# Patient Record
Sex: MALE | Race: WHITE | Employment: UNEMPLOYED | URBAN - METROPOLITAN AREA
[De-identification: names, ages, dates, MRNs, and addresses within clinical notes are randomized per-mention and may not be internally consistent; named-entity substitution may affect disease eponyms.]

---

## 2024-08-22 ENCOUNTER — HOSPITAL ENCOUNTER (INPATIENT)
Age: 84
LOS: 6 days | Discharge: HOSPICE | DRG: 193 | End: 2024-08-28
Attending: STUDENT IN AN ORGANIZED HEALTH CARE EDUCATION/TRAINING PROGRAM | Admitting: INTERNAL MEDICINE
Payer: MEDICARE

## 2024-08-22 ENCOUNTER — APPOINTMENT (OUTPATIENT)
Dept: GENERAL RADIOLOGY | Age: 84
DRG: 193 | End: 2024-08-22
Payer: MEDICARE

## 2024-08-22 DIAGNOSIS — I48.91 ATRIAL FIBRILLATION, UNSPECIFIED TYPE (HCC): ICD-10-CM

## 2024-08-22 DIAGNOSIS — J18.9 PNEUMONIA OF RIGHT LOWER LOBE DUE TO INFECTIOUS ORGANISM: Primary | ICD-10-CM

## 2024-08-22 DIAGNOSIS — A41.9 SEPTICEMIA (HCC): ICD-10-CM

## 2024-08-22 DIAGNOSIS — R06.02 SHORTNESS OF BREATH: ICD-10-CM

## 2024-08-22 DIAGNOSIS — I48.91 ATRIAL FIBRILLATION WITH RAPID VENTRICULAR RESPONSE (HCC): ICD-10-CM

## 2024-08-22 PROBLEM — I48.0 PAROXYSMAL A-FIB (HCC): Status: ACTIVE | Noted: 2024-08-22

## 2024-08-22 LAB
ALBUMIN SERPL-MCNC: 2.9 G/DL (ref 3.4–5)
ALBUMIN/GLOB SERPL: 0.7 {RATIO} (ref 1.1–2.2)
ALP SERPL-CCNC: 287 U/L (ref 40–129)
ALT SERPL-CCNC: 53 U/L (ref 10–40)
ANION GAP SERPL CALCULATED.3IONS-SCNC: 12 MMOL/L (ref 3–16)
AST SERPL-CCNC: 36 U/L (ref 15–37)
BASE EXCESS BLDV CALC-SCNC: -5.6 MMOL/L (ref -3–3)
BASOPHILS # BLD: 0.1 K/UL (ref 0–0.2)
BASOPHILS NFR BLD: 0.3 %
BILIRUB SERPL-MCNC: 0.6 MG/DL (ref 0–1)
BUN SERPL-MCNC: 54 MG/DL (ref 7–20)
CALCIUM SERPL-MCNC: 8.6 MG/DL (ref 8.3–10.6)
CHLORIDE SERPL-SCNC: 109 MMOL/L (ref 99–110)
CO2 BLDV-SCNC: 20 MMOL/L
CO2 SERPL-SCNC: 19 MMOL/L (ref 21–32)
COHGB MFR BLDV: 1.6 % (ref 0–1.5)
CREAT SERPL-MCNC: 1.4 MG/DL (ref 0.8–1.3)
DEPRECATED RDW RBC AUTO: 15.1 % (ref 12.4–15.4)
EKG DIAGNOSIS: NORMAL
EKG Q-T INTERVAL: 310 MS
EKG QRS DURATION: 90 MS
EKG QTC CALCULATION (BAZETT): 496 MS
EKG R AXIS: 42 DEGREES
EKG T AXIS: -38 DEGREES
EKG VENTRICULAR RATE: 154 BPM
EOSINOPHIL # BLD: 0 K/UL (ref 0–0.6)
EOSINOPHIL NFR BLD: 0.1 %
GFR SERPLBLD CREATININE-BSD FMLA CKD-EPI: 50 ML/MIN/{1.73_M2}
GLUCOSE SERPL-MCNC: 133 MG/DL (ref 70–99)
HCO3 BLDV-SCNC: 18.6 MMOL/L (ref 23–29)
HCT VFR BLD AUTO: 36.6 % (ref 40.5–52.5)
HGB BLD-MCNC: 12 G/DL (ref 13.5–17.5)
INR PPP: 1.27 (ref 0.85–1.15)
LACTATE BLDV-SCNC: 1.7 MMOL/L (ref 0.4–1.9)
LACTATE BLDV-SCNC: 1.9 MMOL/L (ref 0.4–2)
LYMPHOCYTES # BLD: 1.2 K/UL (ref 1–5.1)
LYMPHOCYTES NFR BLD: 6.3 %
MCH RBC QN AUTO: 27.4 PG (ref 26–34)
MCHC RBC AUTO-ENTMCNC: 32.8 G/DL (ref 31–36)
MCV RBC AUTO: 83.5 FL (ref 80–100)
METHGB MFR BLDV: 0.1 %
MONOCYTES # BLD: 1.5 K/UL (ref 0–1.3)
MONOCYTES NFR BLD: 8 %
NEUTROPHILS # BLD: 15.5 K/UL (ref 1.7–7.7)
NEUTROPHILS NFR BLD: 85.3 %
NT-PROBNP SERPL-MCNC: ABNORMAL PG/ML (ref 0–449)
O2 THERAPY: ABNORMAL
PCO2 BLDV: 32.5 MMHG (ref 40–50)
PH BLDV: 7.38 [PH] (ref 7.35–7.45)
PLATELET # BLD AUTO: 572 K/UL (ref 135–450)
PMV BLD AUTO: 7.3 FL (ref 5–10.5)
PO2 BLDV: 44.4 MMHG (ref 25–40)
POTASSIUM SERPL-SCNC: 4.2 MMOL/L (ref 3.5–5.1)
PROCALCITONIN SERPL IA-MCNC: 0.39 NG/ML (ref 0–0.15)
PROT SERPL-MCNC: 7.2 G/DL (ref 6.4–8.2)
PROTHROMBIN TIME: 16.1 SEC (ref 11.9–14.9)
RBC # BLD AUTO: 4.38 M/UL (ref 4.2–5.9)
SAO2 % BLDV: 77 %
SARS-COV-2 RDRP RESP QL NAA+PROBE: NOT DETECTED
SODIUM SERPL-SCNC: 140 MMOL/L (ref 136–145)
TROPONIN, HIGH SENSITIVITY: 43 NG/L (ref 0–22)
TROPONIN, HIGH SENSITIVITY: 44 NG/L (ref 0–22)
WBC # BLD AUTO: 18.2 K/UL (ref 4–11)

## 2024-08-22 PROCEDURE — 2500000003 HC RX 250 WO HCPCS: Performed by: NURSE PRACTITIONER

## 2024-08-22 PROCEDURE — 94640 AIRWAY INHALATION TREATMENT: CPT

## 2024-08-22 PROCEDURE — 83880 ASSAY OF NATRIURETIC PEPTIDE: CPT

## 2024-08-22 PROCEDURE — 85025 COMPLETE CBC W/AUTO DIFF WBC: CPT

## 2024-08-22 PROCEDURE — 6370000000 HC RX 637 (ALT 250 FOR IP): Performed by: INTERNAL MEDICINE

## 2024-08-22 PROCEDURE — 85610 PROTHROMBIN TIME: CPT

## 2024-08-22 PROCEDURE — 2500000003 HC RX 250 WO HCPCS: Performed by: STUDENT IN AN ORGANIZED HEALTH CARE EDUCATION/TRAINING PROGRAM

## 2024-08-22 PROCEDURE — 6360000002 HC RX W HCPCS: Performed by: STUDENT IN AN ORGANIZED HEALTH CARE EDUCATION/TRAINING PROGRAM

## 2024-08-22 PROCEDURE — 5A0955A ASSISTANCE WITH RESPIRATORY VENTILATION, GREATER THAN 96 CONSECUTIVE HOURS, HIGH NASAL FLOW/VELOCITY: ICD-10-PCS | Performed by: INTERNAL MEDICINE

## 2024-08-22 PROCEDURE — 84484 ASSAY OF TROPONIN QUANT: CPT

## 2024-08-22 PROCEDURE — 82803 BLOOD GASES ANY COMBINATION: CPT

## 2024-08-22 PROCEDURE — 2580000003 HC RX 258: Performed by: INTERNAL MEDICINE

## 2024-08-22 PROCEDURE — 2060000000 HC ICU INTERMEDIATE R&B

## 2024-08-22 PROCEDURE — 94761 N-INVAS EAR/PLS OXIMETRY MLT: CPT

## 2024-08-22 PROCEDURE — 87040 BLOOD CULTURE FOR BACTERIA: CPT

## 2024-08-22 PROCEDURE — 96376 TX/PRO/DX INJ SAME DRUG ADON: CPT

## 2024-08-22 PROCEDURE — 2700000000 HC OXYGEN THERAPY PER DAY

## 2024-08-22 PROCEDURE — 80053 COMPREHEN METABOLIC PANEL: CPT

## 2024-08-22 PROCEDURE — 84145 PROCALCITONIN (PCT): CPT

## 2024-08-22 PROCEDURE — 2580000003 HC RX 258: Performed by: STUDENT IN AN ORGANIZED HEALTH CARE EDUCATION/TRAINING PROGRAM

## 2024-08-22 PROCEDURE — 2580000003 HC RX 258: Performed by: NURSE PRACTITIONER

## 2024-08-22 PROCEDURE — 96374 THER/PROPH/DIAG INJ IV PUSH: CPT

## 2024-08-22 PROCEDURE — 87635 SARS-COV-2 COVID-19 AMP PRB: CPT

## 2024-08-22 PROCEDURE — 71045 X-RAY EXAM CHEST 1 VIEW: CPT

## 2024-08-22 PROCEDURE — 6360000002 HC RX W HCPCS: Performed by: NURSE PRACTITIONER

## 2024-08-22 PROCEDURE — 96372 THER/PROPH/DIAG INJ SC/IM: CPT

## 2024-08-22 PROCEDURE — 93010 ELECTROCARDIOGRAM REPORT: CPT | Performed by: INTERNAL MEDICINE

## 2024-08-22 PROCEDURE — 83605 ASSAY OF LACTIC ACID: CPT

## 2024-08-22 PROCEDURE — 99285 EMERGENCY DEPT VISIT HI MDM: CPT

## 2024-08-22 PROCEDURE — 93005 ELECTROCARDIOGRAM TRACING: CPT | Performed by: STUDENT IN AN ORGANIZED HEALTH CARE EDUCATION/TRAINING PROGRAM

## 2024-08-22 RX ORDER — SODIUM CHLORIDE 0.9 % (FLUSH) 0.9 %
5-40 SYRINGE (ML) INJECTION PRN
Status: DISCONTINUED | OUTPATIENT
Start: 2024-08-22 | End: 2024-08-28 | Stop reason: HOSPADM

## 2024-08-22 RX ORDER — DILTIAZEM HYDROCHLORIDE 5 MG/ML
10 INJECTION INTRAVENOUS ONCE
Status: COMPLETED | OUTPATIENT
Start: 2024-08-22 | End: 2024-08-22

## 2024-08-22 RX ORDER — POLYETHYLENE GLYCOL 3350 17 G/17G
17 POWDER, FOR SOLUTION ORAL DAILY PRN
Status: DISCONTINUED | OUTPATIENT
Start: 2024-08-22 | End: 2024-08-28 | Stop reason: HOSPADM

## 2024-08-22 RX ORDER — METOPROLOL TARTRATE 1 MG/ML
2.5 INJECTION, SOLUTION INTRAVENOUS ONCE
Status: COMPLETED | OUTPATIENT
Start: 2024-08-22 | End: 2024-08-22

## 2024-08-22 RX ORDER — ENOXAPARIN SODIUM 150 MG/ML
1 INJECTION SUBCUTANEOUS 2 TIMES DAILY
Status: DISCONTINUED | OUTPATIENT
Start: 2024-08-23 | End: 2024-08-28 | Stop reason: HOSPADM

## 2024-08-22 RX ORDER — ACETAMINOPHEN 650 MG/1
650 SUPPOSITORY RECTAL EVERY 6 HOURS PRN
Status: DISCONTINUED | OUTPATIENT
Start: 2024-08-22 | End: 2024-08-28 | Stop reason: HOSPADM

## 2024-08-22 RX ORDER — DILTIAZEM HYDROCHLORIDE 5 MG/ML
20 INJECTION INTRAVENOUS ONCE
Status: COMPLETED | OUTPATIENT
Start: 2024-08-22 | End: 2024-08-22

## 2024-08-22 RX ORDER — ENOXAPARIN SODIUM 150 MG/ML
1 INJECTION SUBCUTANEOUS ONCE
Status: COMPLETED | OUTPATIENT
Start: 2024-08-22 | End: 2024-08-22

## 2024-08-22 RX ORDER — ONDANSETRON 4 MG/1
4 TABLET, ORALLY DISINTEGRATING ORAL EVERY 8 HOURS PRN
Status: DISCONTINUED | OUTPATIENT
Start: 2024-08-22 | End: 2024-08-28 | Stop reason: HOSPADM

## 2024-08-22 RX ORDER — SODIUM CHLORIDE 0.9 % (FLUSH) 0.9 %
5-40 SYRINGE (ML) INJECTION EVERY 12 HOURS SCHEDULED
Status: DISCONTINUED | OUTPATIENT
Start: 2024-08-22 | End: 2024-08-28 | Stop reason: HOSPADM

## 2024-08-22 RX ORDER — GUAIFENESIN 600 MG/1
600 TABLET, EXTENDED RELEASE ORAL 2 TIMES DAILY
Status: DISCONTINUED | OUTPATIENT
Start: 2024-08-22 | End: 2024-08-28

## 2024-08-22 RX ORDER — IPRATROPIUM BROMIDE AND ALBUTEROL SULFATE 2.5; .5 MG/3ML; MG/3ML
1 SOLUTION RESPIRATORY (INHALATION)
Status: DISCONTINUED | OUTPATIENT
Start: 2024-08-22 | End: 2024-08-24

## 2024-08-22 RX ORDER — ONDANSETRON 2 MG/ML
4 INJECTION INTRAMUSCULAR; INTRAVENOUS EVERY 6 HOURS PRN
Status: DISCONTINUED | OUTPATIENT
Start: 2024-08-22 | End: 2024-08-28 | Stop reason: HOSPADM

## 2024-08-22 RX ORDER — DOXYCYCLINE HYCLATE 100 MG
100 TABLET ORAL EVERY 12 HOURS SCHEDULED
Status: COMPLETED | OUTPATIENT
Start: 2024-08-22 | End: 2024-08-27

## 2024-08-22 RX ORDER — SODIUM CHLORIDE 9 MG/ML
INJECTION, SOLUTION INTRAVENOUS PRN
Status: DISCONTINUED | OUTPATIENT
Start: 2024-08-22 | End: 2024-08-28 | Stop reason: HOSPADM

## 2024-08-22 RX ORDER — ACETAMINOPHEN 325 MG/1
650 TABLET ORAL EVERY 6 HOURS PRN
Status: DISCONTINUED | OUTPATIENT
Start: 2024-08-22 | End: 2024-08-28 | Stop reason: HOSPADM

## 2024-08-22 RX ADMIN — METOPROLOL TARTRATE 2.5 MG: 5 INJECTION INTRAVENOUS at 20:22

## 2024-08-22 RX ADMIN — SODIUM CHLORIDE, PRESERVATIVE FREE 10 ML: 5 INJECTION INTRAVENOUS at 20:22

## 2024-08-22 RX ADMIN — AMIODARONE HYDROCHLORIDE 1 MG/MIN: 50 INJECTION, SOLUTION INTRAVENOUS at 22:45

## 2024-08-22 RX ADMIN — GUAIFENESIN 600 MG: 600 TABLET ORAL at 20:22

## 2024-08-22 RX ADMIN — SODIUM CHLORIDE 5 MG/HR: 900 INJECTION, SOLUTION INTRAVENOUS at 16:03

## 2024-08-22 RX ADMIN — DOXYCYCLINE HYCLATE 100 MG: 100 TABLET, COATED ORAL at 20:22

## 2024-08-22 RX ADMIN — DILTIAZEM HYDROCHLORIDE 10 MG: 5 INJECTION, SOLUTION INTRAVENOUS at 15:26

## 2024-08-22 RX ADMIN — CEFTRIAXONE SODIUM 1000 MG: 1 INJECTION, POWDER, FOR SOLUTION INTRAMUSCULAR; INTRAVENOUS at 16:46

## 2024-08-22 RX ADMIN — DILTIAZEM HYDROCHLORIDE 20 MG: 5 INJECTION, SOLUTION INTRAVENOUS at 15:57

## 2024-08-22 RX ADMIN — ENOXAPARIN SODIUM 120 MG: 150 INJECTION SUBCUTANEOUS at 16:47

## 2024-08-22 RX ADMIN — IPRATROPIUM BROMIDE AND ALBUTEROL SULFATE 1 DOSE: 2.5; .5 SOLUTION RESPIRATORY (INHALATION) at 20:02

## 2024-08-22 ASSESSMENT — PAIN SCALES - GENERAL
PAINLEVEL_OUTOF10: 0

## 2024-08-22 ASSESSMENT — PAIN - FUNCTIONAL ASSESSMENT: PAIN_FUNCTIONAL_ASSESSMENT: 0-10

## 2024-08-22 NOTE — ED NOTES
Low Brooks is a 83 y.o. male admitted for  Principal Problem:    Paroxysmal A-fib (HCC)  Resolved Problems:    * No resolved hospital problems. *  .   Patient Home via self with   Chief Complaint   Patient presents with    Fatigue    Shortness of Breath     Patient arrives from home C/O generalized weakness and SOB. Patient reports signs and symptoms have been ongoing for \"months.\" Patient denies chest pain, denies N/V. EMS reports patient does not wear oxygen at baseline, 86% RA, placed on 2L NC initially but titrated to 4L NC. Patient poor historian, unknown medical and surgical history.    .  Patient is alert and Person, Place, Time, and Situation  Patient's baseline mobility: Baseline Mobility: Walker  Code Status: Full Code   Cardiac Rhythm:    O2 Flow Rate (L/min): 8 L/min  Is patient on baseline Oxygen: no how many Liters8:   Abnormal Assessment Findings:      Isolation: None      NIH Score:    C-SSRS: Risk of Suicide: No Risk  Bedside swallow:        Active LDA's:   Peripheral IV 08/22/24 Right Forearm (Active)       Peripheral IV 08/22/24 Right Hand (Active)     Patient admitted with a crow: no If the crow is chronic was it exchanged:  Reason for crow:   Patient admitted with Central Line:  . PICC line placement confirmed: YES OR NO:[id number]}   Reason for Central line:   Was central line Inserted from an outside facility:        Family/Caregiver Present yes Any Concerns: no   Restraints no  Sitter no         Vitals: MEWS Score: 6    Vitals:    08/22/24 1635 08/22/24 1636 08/22/24 1705 08/22/24 1728   BP:  129/85 (!) 130/92 132/79   Pulse:  (!) 140 (!) 144 (!) 137   Resp:  (!) 47 (!) 46 (!) 46   Temp:       TempSrc:       SpO2:  (!) 88% 92% 92%   Weight: 122.5 kg (270 lb)      Height: 1.93 m (6' 4\")          Last documented pain score (0-10 scale) Pain Level: 0 (Patient denies pain)  Pain medication administered No.    Pertinent or High Risk Medications/Drips: Yes- see MAR.    Pending Blood Product  pneumonia. Patient on high flow nasal cannula at 8L, currently on diltiazem drip.   If any further questions, please call Sending RN at [phone]

## 2024-08-22 NOTE — ED PROVIDER NOTES
[facility]  ED      CHIEF COMPLAINT  Fatigue and Shortness of Breath (Patient arrives from home C/O generalized weakness and SOB. Patient reports signs and symptoms have been ongoing for \"months.\" Patient denies chest pain, denies N/V. EMS reports patient does not wear oxygen at baseline, 86% RA, placed on 2L NC initially but titrated to 4L NC. Patient poor historian, unknown medical and surgical history. )       HISTORY OF PRESENT ILLNESS  Low Brooks is a 83 y.o. male  who presents to the ED complaining of generalized weakness and shortness of breath.  Patient states symptoms have been ongoing for months and states that the shortness of breath comes and goes.  His wife at the bedside assist with the history and reports that over the last 2 to 3 days he has been significantly more short of breath, particularly not being able to walk around the home without severe dyspnea, which is unusual for him.  He denies any chest pain, nausea, vomiting, diarrhea.  They have noticed some leg swelling recently.  On arrival he was 86% on room air, improved on 5 L nasal cannula.    No other complaints, modifying factors or associated symptoms.     I have reviewed the following from the nursing documentation.    Past Medical History:   Diagnosis Date    Hyperlipidemia     Hypertension      No past surgical history on file.  No family history on file.  Social History     Socioeconomic History    Marital status:      Spouse name: Not on file    Number of children: Not on file    Years of education: Not on file    Highest education level: Not on file   Occupational History    Not on file   Tobacco Use    Smoking status: Not on file    Smokeless tobacco: Not on file   Substance and Sexual Activity    Alcohol use: Not on file    Drug use: Not on file    Sexual activity: Not on file   Other Topics Concern    Not on file   Social History Narrative    Not on file     Social Determinants of Health     Financial

## 2024-08-22 NOTE — PROGRESS NOTES
4 Eyes Skin Assessment     NAME:  Low Brooks  YOB: 1940  MEDICAL RECORD NUMBER:  [id number]    The patient is being assessed for  Admission    I agree that at least one RN has performed a thorough Head to Toe Skin Assessment on the patient. ALL assessment sites listed below have been assessed.      Areas assessed by both nurses:    Head, Face, Ears, Shoulders, Back, Chest, Arms, Elbows, Hands, Sacrum. Buttock, Coccyx, Ischium, Legs. Feet and Heels, and Under Medical Devices         Does the Patient have a Wound? No noted wound(s)       Waldemar Prevention initiated by RN: Yes  Wound Care Orders initiated by RN: No    Pressure Injury (Stage 3,4, Unstageable, DTI, NWPT, and Complex wounds) if present, place Wound referral order by RN under : No    New Ostomies, if present place, Ostomy referral order under : No     Nurse 1 eSignature: Electronically signed by Giovanni Fang RN on 8/22/24 at 7:32 PM EDT    **SHARE this note so that the co-signing nurse can place an eSignature**    Nurse 2 eSignature: Electronically signed by Samreen Perera RN on 8/22/24 at 11:19 PM EDT

## 2024-08-22 NOTE — ED NOTES
Blood cultures obtained prior to antibiotic administration. Cultures scanned in soft lab but not clicked off in Epic.

## 2024-08-23 ENCOUNTER — APPOINTMENT (OUTPATIENT)
Age: 84
DRG: 193 | End: 2024-08-23
Attending: INTERNAL MEDICINE
Payer: MEDICARE

## 2024-08-23 ENCOUNTER — TELEPHONE (OUTPATIENT)
Dept: CARDIOLOGY CLINIC | Age: 84
End: 2024-08-23

## 2024-08-23 LAB
ALBUMIN SERPL-MCNC: 2.9 G/DL (ref 3.4–5)
ALBUMIN/GLOB SERPL: 0.7 {RATIO} (ref 1.1–2.2)
ALP SERPL-CCNC: 269 U/L (ref 40–129)
ALT SERPL-CCNC: 52 U/L (ref 10–40)
ANION GAP SERPL CALCULATED.3IONS-SCNC: 13 MMOL/L (ref 3–16)
AST SERPL-CCNC: 40 U/L (ref 15–37)
BASOPHILS # BLD: 0 K/UL (ref 0–0.2)
BASOPHILS NFR BLD: 0.2 %
BILIRUB SERPL-MCNC: 0.4 MG/DL (ref 0–1)
BUN SERPL-MCNC: 69 MG/DL (ref 7–20)
CALCIUM SERPL-MCNC: 8.4 MG/DL (ref 8.3–10.6)
CHLORIDE SERPL-SCNC: 108 MMOL/L (ref 99–110)
CO2 SERPL-SCNC: 17 MMOL/L (ref 21–32)
CREAT SERPL-MCNC: 1.5 MG/DL (ref 0.8–1.3)
DEPRECATED RDW RBC AUTO: 15 % (ref 12.4–15.4)
ECHO AO ROOT DIAM: 3.9 CM
ECHO AO ROOT INDEX: 1.55 CM/M2
ECHO AV AREA PEAK VELOCITY: 3.8 CM2
ECHO AV AREA/BSA PEAK VELOCITY: 1.5 CM2/M2
ECHO AV CUSP MM: 2.3 CM
ECHO AV PEAK GRADIENT: 6 MMHG
ECHO AV PEAK VELOCITY: 1.3 M/S
ECHO AV VELOCITY RATIO: 0.77
ECHO BSA: 2.56 M2
ECHO EST RA PRESSURE: 8 MMHG
ECHO IVC EXP: 1.9 CM
ECHO IVC INSP: 1.1 CM
ECHO LA AREA 2C: 31.9 CM2
ECHO LA AREA 4C: 39.6 CM2
ECHO LA DIAMETER INDEX: 2.27 CM/M2
ECHO LA DIAMETER: 5.7 CM
ECHO LA MAJOR AXIS: 8.7 CM
ECHO LA MINOR AXIS: 8.2 CM
ECHO LA TO AORTIC ROOT RATIO: 1.46
ECHO LA VOL BP: 124 ML (ref 18–58)
ECHO LA VOL MOD A2C: 101 ML (ref 18–58)
ECHO LA VOL MOD A4C: 146 ML (ref 18–58)
ECHO LA VOL/BSA BIPLANE: 49 ML/M2 (ref 16–34)
ECHO LA VOLUME INDEX MOD A2C: 40 ML/M2 (ref 16–34)
ECHO LA VOLUME INDEX MOD A4C: 58 ML/M2 (ref 16–34)
ECHO LV E' SEPTAL VELOCITY: 7 CM/S
ECHO LV EDV A2C: 188 ML
ECHO LV EDV A4C: 185 ML
ECHO LV EDV BP: 189 ML (ref 67–155)
ECHO LV EDV INDEX A4C: 74 ML/M2
ECHO LV EDV INDEX BP: 75 ML/M2
ECHO LV EDV NDEX A2C: 75 ML/M2
ECHO LV EF PHYSICIAN: 28 %
ECHO LV EJECTION FRACTION A2C: 18 %
ECHO LV EJECTION FRACTION A4C: 25 %
ECHO LV ESV A2C: 154 ML
ECHO LV ESV A4C: 138 ML
ECHO LV ESV BP: 147 ML (ref 22–58)
ECHO LV ESV INDEX A2C: 61 ML/M2
ECHO LV ESV INDEX A4C: 55 ML/M2
ECHO LV ESV INDEX BP: 59 ML/M2
ECHO LV FRACTIONAL SHORTENING: 18 % (ref 28–44)
ECHO LV INTERNAL DIMENSION DIASTOLE INDEX: 2.47 CM/M2
ECHO LV INTERNAL DIMENSION DIASTOLIC: 6.2 CM (ref 4.2–5.9)
ECHO LV INTERNAL DIMENSION SYSTOLIC INDEX: 2.03 CM/M2
ECHO LV INTERNAL DIMENSION SYSTOLIC: 5.1 CM
ECHO LV ISOVOLUMETRIC RELAXATION TIME (IVRT): 74 MS
ECHO LV IVSD: 1.3 CM (ref 0.6–1)
ECHO LV MASS 2D: 313.3 G (ref 88–224)
ECHO LV MASS INDEX 2D: 124.8 G/M2 (ref 49–115)
ECHO LV POSTERIOR WALL DIASTOLIC: 1 CM (ref 0.6–1)
ECHO LV RELATIVE WALL THICKNESS RATIO: 0.32
ECHO LVOT AREA: 4.9 CM2
ECHO LVOT DIAM: 2.5 CM
ECHO LVOT MEAN GRADIENT: 2 MMHG
ECHO LVOT PEAK GRADIENT: 4 MMHG
ECHO LVOT PEAK VELOCITY: 1 M/S
ECHO LVOT STROKE VOLUME INDEX: 28 ML/M2
ECHO LVOT SV: 70.2 ML
ECHO LVOT VTI: 14.3 CM
ECHO MV E VELOCITY: 0.8 M/S
ECHO MV E/E' SEPTAL: 11.43
ECHO MV REGURGITANT ALIASING (NYQUIST) VELOCITY: 39 CM/S
ECHO MV REGURGITANT RADIUS PISA: 0.5 CM
ECHO MV REGURGITANT VTIA: 124 CM
ECHO RA AREA 4C: 41.7 CM2
ECHO RA END SYSTOLIC VOLUME APICAL 4 CHAMBER INDEX BSA: 64 ML/M2
ECHO RA VOLUME: 160 ML
ECHO RIGHT VENTRICULAR SYSTOLIC PRESSURE (RVSP): 23 MMHG
ECHO RV BASAL DIMENSION: 4.8 CM
ECHO RV EJECTION FRACTION 3D: 36 %
ECHO RV FRACTIONAL AREA CHANGE: 31 %
ECHO RV FREE WALL PEAK S': 14 CM/S
ECHO RV INTERNAL DIMENSION: 3.9 CM
ECHO RV LONGITUDINAL DIMENSION: 8.9 CM
ECHO RV MID DIMENSION: 3.4 CM
ECHO RV TAPSE: 1.3 CM (ref 1.7–?)
ECHO TV REGURGITANT MAX VELOCITY: 1.95 M/S
ECHO TV REGURGITANT PEAK GRADIENT: 15 MMHG
EOSINOPHIL # BLD: 0 K/UL (ref 0–0.6)
EOSINOPHIL NFR BLD: 0 %
GFR SERPLBLD CREATININE-BSD FMLA CKD-EPI: 46 ML/MIN/{1.73_M2}
GLUCOSE SERPL-MCNC: 170 MG/DL (ref 70–99)
HCT VFR BLD AUTO: 36.5 % (ref 40.5–52.5)
HGB BLD-MCNC: 11.7 G/DL (ref 13.5–17.5)
LACTATE BLDV-SCNC: 1.7 MMOL/L (ref 0.4–2)
LACTATE BLDV-SCNC: 1.9 MMOL/L (ref 0.4–2)
LACTATE BLDV-SCNC: 2.1 MMOL/L (ref 0.4–2)
LACTATE BLDV-SCNC: 2.8 MMOL/L (ref 0.4–2)
LYMPHOCYTES # BLD: 0.7 K/UL (ref 1–5.1)
LYMPHOCYTES NFR BLD: 6 %
Lab: -13 %
MCH RBC QN AUTO: 26.9 PG (ref 26–34)
MCHC RBC AUTO-ENTMCNC: 32.2 G/DL (ref 31–36)
MCV RBC AUTO: 83.5 FL (ref 80–100)
MONOCYTES # BLD: 0.4 K/UL (ref 0–1.3)
MONOCYTES NFR BLD: 3.5 %
NEUTROPHILS # BLD: 11 K/UL (ref 1.7–7.7)
NEUTROPHILS NFR BLD: 90.3 %
PLATELET # BLD AUTO: 553 K/UL (ref 135–450)
PMV BLD AUTO: 7.2 FL (ref 5–10.5)
POTASSIUM SERPL-SCNC: 4.1 MMOL/L (ref 3.5–5.1)
PROT SERPL-MCNC: 7.1 G/DL (ref 6.4–8.2)
RBC # BLD AUTO: 4.37 M/UL (ref 4.2–5.9)
SODIUM SERPL-SCNC: 138 MMOL/L (ref 136–145)
WBC # BLD AUTO: 12.2 K/UL (ref 4–11)

## 2024-08-23 PROCEDURE — 93306 TTE W/DOPPLER COMPLETE: CPT | Performed by: INTERNAL MEDICINE

## 2024-08-23 PROCEDURE — 2500000003 HC RX 250 WO HCPCS: Performed by: NURSE PRACTITIONER

## 2024-08-23 PROCEDURE — 92610 EVALUATE SWALLOWING FUNCTION: CPT

## 2024-08-23 PROCEDURE — 51702 INSERT TEMP BLADDER CATH: CPT

## 2024-08-23 PROCEDURE — 83605 ASSAY OF LACTIC ACID: CPT

## 2024-08-23 PROCEDURE — 2580000003 HC RX 258: Performed by: INTERNAL MEDICINE

## 2024-08-23 PROCEDURE — 93306 TTE W/DOPPLER COMPLETE: CPT

## 2024-08-23 PROCEDURE — 94640 AIRWAY INHALATION TREATMENT: CPT

## 2024-08-23 PROCEDURE — 6370000000 HC RX 637 (ALT 250 FOR IP): Performed by: INTERNAL MEDICINE

## 2024-08-23 PROCEDURE — 99291 CRITICAL CARE FIRST HOUR: CPT | Performed by: INTERNAL MEDICINE

## 2024-08-23 PROCEDURE — 51701 INSERT BLADDER CATHETER: CPT

## 2024-08-23 PROCEDURE — 85025 COMPLETE CBC W/AUTO DIFF WBC: CPT

## 2024-08-23 PROCEDURE — 97166 OT EVAL MOD COMPLEX 45 MIN: CPT

## 2024-08-23 PROCEDURE — 97162 PT EVAL MOD COMPLEX 30 MIN: CPT

## 2024-08-23 PROCEDURE — 76376 3D RENDER W/INTRP POSTPROCES: CPT | Performed by: INTERNAL MEDICINE

## 2024-08-23 PROCEDURE — 6360000002 HC RX W HCPCS: Performed by: NURSE PRACTITIONER

## 2024-08-23 PROCEDURE — 6360000002 HC RX W HCPCS

## 2024-08-23 PROCEDURE — 80053 COMPREHEN METABOLIC PANEL: CPT

## 2024-08-23 PROCEDURE — 2500000003 HC RX 250 WO HCPCS

## 2024-08-23 PROCEDURE — 6360000002 HC RX W HCPCS: Performed by: INTERNAL MEDICINE

## 2024-08-23 PROCEDURE — 97530 THERAPEUTIC ACTIVITIES: CPT

## 2024-08-23 PROCEDURE — 2700000000 HC OXYGEN THERAPY PER DAY

## 2024-08-23 PROCEDURE — 36415 COLL VENOUS BLD VENIPUNCTURE: CPT

## 2024-08-23 PROCEDURE — 2580000003 HC RX 258: Performed by: NURSE PRACTITIONER

## 2024-08-23 PROCEDURE — 51798 US URINE CAPACITY MEASURE: CPT

## 2024-08-23 PROCEDURE — 94761 N-INVAS EAR/PLS OXIMETRY MLT: CPT

## 2024-08-23 PROCEDURE — 2060000000 HC ICU INTERMEDIATE R&B

## 2024-08-23 RX ORDER — FUROSEMIDE 10 MG/ML
20 INJECTION INTRAMUSCULAR; INTRAVENOUS ONCE
Status: COMPLETED | OUTPATIENT
Start: 2024-08-23 | End: 2024-08-23

## 2024-08-23 RX ORDER — AMLODIPINE BESYLATE 10 MG/1
10 TABLET ORAL DAILY
Status: ON HOLD | COMMUNITY
End: 2024-08-28 | Stop reason: HOSPADM

## 2024-08-23 RX ORDER — ATORVASTATIN CALCIUM 20 MG/1
20 TABLET, FILM COATED ORAL DAILY
Status: ON HOLD | COMMUNITY
End: 2024-08-28 | Stop reason: HOSPADM

## 2024-08-23 RX ORDER — AMIODARONE HYDROCHLORIDE 200 MG/1
200 TABLET ORAL 2 TIMES DAILY
Status: DISCONTINUED | OUTPATIENT
Start: 2024-08-23 | End: 2024-08-24

## 2024-08-23 RX ORDER — METOPROLOL TARTRATE 1 MG/ML
5 INJECTION, SOLUTION INTRAVENOUS ONCE
Status: COMPLETED | OUTPATIENT
Start: 2024-08-23 | End: 2024-08-23

## 2024-08-23 RX ADMIN — AMIODARONE HYDROCHLORIDE 0.5 MG/MIN: 50 INJECTION, SOLUTION INTRAVENOUS at 20:48

## 2024-08-23 RX ADMIN — FUROSEMIDE 20 MG: 10 INJECTION, SOLUTION INTRAMUSCULAR; INTRAVENOUS at 09:43

## 2024-08-23 RX ADMIN — IPRATROPIUM BROMIDE AND ALBUTEROL SULFATE 1 DOSE: 2.5; .5 SOLUTION RESPIRATORY (INHALATION) at 07:43

## 2024-08-23 RX ADMIN — DOXYCYCLINE HYCLATE 100 MG: 100 TABLET, COATED ORAL at 08:29

## 2024-08-23 RX ADMIN — CEFTRIAXONE SODIUM 1000 MG: 1 INJECTION, POWDER, FOR SOLUTION INTRAMUSCULAR; INTRAVENOUS at 09:46

## 2024-08-23 RX ADMIN — METOPROLOL TARTRATE 5 MG: 5 INJECTION INTRAVENOUS at 14:12

## 2024-08-23 RX ADMIN — ENOXAPARIN SODIUM 120 MG: 150 INJECTION SUBCUTANEOUS at 08:29

## 2024-08-23 RX ADMIN — GUAIFENESIN 600 MG: 600 TABLET ORAL at 20:42

## 2024-08-23 RX ADMIN — DOXYCYCLINE HYCLATE 100 MG: 100 TABLET, COATED ORAL at 20:42

## 2024-08-23 RX ADMIN — METOPROLOL TARTRATE 5 MG: 1 INJECTION, SOLUTION INTRAVENOUS at 01:18

## 2024-08-23 RX ADMIN — GUAIFENESIN 600 MG: 600 TABLET ORAL at 08:29

## 2024-08-23 RX ADMIN — METOPROLOL TARTRATE 5 MG: 5 INJECTION INTRAVENOUS at 09:22

## 2024-08-23 RX ADMIN — IPRATROPIUM BROMIDE AND ALBUTEROL SULFATE 1 DOSE: 2.5; .5 SOLUTION RESPIRATORY (INHALATION) at 11:51

## 2024-08-23 RX ADMIN — ENOXAPARIN SODIUM 120 MG: 150 INJECTION SUBCUTANEOUS at 20:43

## 2024-08-23 RX ADMIN — AMIODARONE HYDROCHLORIDE 0.5 MG/MIN: 50 INJECTION, SOLUTION INTRAVENOUS at 04:46

## 2024-08-23 RX ADMIN — IPRATROPIUM BROMIDE AND ALBUTEROL SULFATE 1 DOSE: 2.5; .5 SOLUTION RESPIRATORY (INHALATION) at 19:24

## 2024-08-23 RX ADMIN — METOPROLOL TARTRATE 5 MG: 5 INJECTION INTRAVENOUS at 00:24

## 2024-08-23 RX ADMIN — AMIODARONE HYDROCHLORIDE 200 MG: 200 TABLET ORAL at 17:04

## 2024-08-23 ASSESSMENT — PAIN SCALES - GENERAL
PAINLEVEL_OUTOF10: 0

## 2024-08-23 NOTE — PROGRESS NOTES
Occupational Therapy  Facility/Department: [facility]  Occupational Therapy Initial Assessment and Treatment Note     Name: Low Brooks  : 1940  MRN: [id number]  Date of Service: 2024    Discharge Recommendations:  Subacute/Skilled Nursing Facility     Therapy discharge recommendations are subject to collaboration from the patient’s interdisciplinary healthcare team, including MD and case management recommendations.    Barriers to Home Discharge:   [] Steps to access home entry or bed/bath:   [x] Unable to transfer, ambulate, or propel wheelchair household distances without assist   [x] Limited available assist at home upon discharge    [] Patient or family requests d/c to post-acute facility    [x] Poor cognition/safety awareness for d/c to home alone    [] Unable to maintain ordered weight bearing status    [] Patient with salient signs of long-standing immobility   [x] Decreased independence with ADLs   [x] Increased risk for falls   [] Other:    If pt is unable to be seen after this session, please let this note serve as discharge summary.  Please see case management note for discharge disposition.  Thank you.       Patient Diagnosis(es): The primary encounter diagnosis was Pneumonia of right lower lobe due to infectious organism. Diagnoses of Septicemia (HCC), Atrial fibrillation with rapid ventricular response (HCC), Atrial fibrillation, unspecified type (HCC), and Shortness of breath were also pertinent to this visit.  Past Medical History:  has a past medical history of Hyperlipidemia and Hypertension.  Past Surgical History:  has no past surgical history on file.    Treatment Diagnosis: Paroxysmal A-fib (HCC)      Assessment  Performance deficits / Impairments: Decreased functional mobility ;Decreased balance;Decreased safe awareness;Decreased ADL status;Decreased endurance;Decreased strength  Assessment: Pt admitted to [facility] for SOB and dyspnea. Pt from home with spouse, reports IND in

## 2024-08-23 NOTE — PROGRESS NOTES
0112: Patient attempting to urinate 2 times in last 45 minutes with no success. States he feels the urge to urinate but cannot. Bladder scan showed 460mL. Jhony Connors NP notified via perfect serve. One time Straight Catheterization ordered.     0136: 440mL out with straight cath. Post cath residual Bladder scan showed 50mL.

## 2024-08-23 NOTE — RT PROTOCOL NOTE
RT Inhaler-Nebulizer Bronchodilator Protocol Note    There is a bronchodilator order in the chart from a provider indicating to follow the RT Bronchodilator Protocol and there is an “Initiate RT Inhaler-Nebulizer Bronchodilator Protocol” order as well (see protocol at bottom of note).    CXR Findings:  XR CHEST PORTABLE    Result Date: 8/22/2024  1. Patchy consolidation of the right lung base concerning for pneumonia. Follow-up to resolution is recommended. 2. Cardiomegaly.       The findings from the last RT Protocol Assessment were as follows:   History Pulmonary Disease: None or smoker <15 pack years  Respiratory Pattern: Regular pattern and RR 12-20 bpm  Breath Sounds: Slightly diminished and/or crackles  Cough: Strong, spontaneous, non-productive  Indication for Bronchodilator Therapy: None  Bronchodilator Assessment Score: 2    Aerosolized bronchodilator medication orders have been revised according to the RT Inhaler-Nebulizer Bronchodilator Protocol below.    Respiratory Therapist to perform RT Therapy Protocol Assessment initially then follow the protocol.  Repeat RT Therapy Protocol Assessment PRN for score 0-3 or on second treatment, BID, and PRN for scores above 3.    No Indications - adjust the frequency to every 6 hours PRN wheezing or bronchospasm, if no treatments needed after 48 hours then discontinue using Per Protocol order mode.     If indication present, adjust the RT bronchodilator orders based on the Bronchodilator Assessment Score as indicated below.  Use Inhaler orders unless patient has one or more of the following: on home nebulizer, not able to hold breath for 10 seconds, is not alert and oriented, cannot activate and use MDI correctly, or respiratory rate 25 breaths per minute or more, then use the equivalent nebulizer order(s) with same Frequency and PRN reasons based on the score.  If a patient is on this medication at home then do not decrease Frequency below that used at home.    0-3

## 2024-08-23 NOTE — PLAN OF CARE
SLP completed evaluation. Please refer to notes in EMR.   Laura Salazar M.S. [facility]-SLP #[id number]  Speech Language Pathologist

## 2024-08-23 NOTE — PLAN OF CARE
Problem: Discharge Planning  Goal: Discharge to home or other facility with appropriate resources  8/23/2024 1430 by Madisyn Boucher RN  Outcome: Progressing  8/23/2024 0337 by Samreen Perera RN  Outcome: Progressing  Flowsheets (Taken 8/22/2024 1825 by Giovanni Fang, RN)  Discharge to home or other facility with appropriate resources:   Identify barriers to discharge with patient and caregiver   Arrange for needed discharge resources and transportation as appropriate     Problem: Pain  Goal: Verbalizes/displays adequate comfort level or baseline comfort level  8/23/2024 1430 by Madisyn Boucher RN  Outcome: Progressing  8/23/2024 0337 by Samreen Perera RN  Outcome: Progressing     Problem: Skin/Tissue Integrity  Goal: Absence of new skin breakdown  Description: 1.  Monitor for areas of redness and/or skin breakdown  2.  Assess vascular access sites hourly  3.  Every 4-6 hours minimum:  Change oxygen saturation probe site  4.  Every 4-6 hours:  If on nasal continuous positive airway pressure, respiratory therapy assess nares and determine need for appliance change or resting period.  8/23/2024 1430 by Madisyn Boucher RN  Outcome: Progressing  8/23/2024 0337 by Samreen Perera RN  Outcome: Progressing     Problem: Safety - Adult  Goal: Free from fall injury  8/23/2024 1430 by Madisyn Boucher RN  Outcome: Progressing  8/23/2024 0337 by Samreen Perera RN  Outcome: Progressing

## 2024-08-23 NOTE — CONSULTS
Consult Call Back    Who: [facility]   Date:8/22/2024,  Time:10:55 PM    Voicemail left  Electronically signed by Brittnee Ramirez on 8/22/24 at 10:55 PM EDT

## 2024-08-23 NOTE — PROGRESS NOTES
Hospital Medicine Progress Note      Date of Admission: 8/22/2024  Hospital Day: 2    Chief Admission Complaint:  Shortness of breath, Atrial fibrillation     Subjective:  Patient seen and examined this morning. He states that he is mildly short of breath. He states that he has never been diagnosed with heart failure or atrial fibrillation. He denies any chest pain.     Presenting Admission History:       83 y.o. male who presented to the emergency room with a chief complaint of shortness of breath and feeling generalized fatigue and weakness.       Upon arrival to the emergency room patient was noted to have elevated heart rate ranging from 130s to 170s  Twelve-lead EKG reveals A-fib with RVR patient workup was started with IV diltiazem bolus of 10 mg with little effect on heart rate patient received another 20 mg followed by diltiazem drip  Patient's heart rate gradually improved in the range of 130s     Currently on no home medications available.  The workup in the emergency room notable for:     Upon arrival to the emergency room patient's saturation was 86% on room air.  Patient placed on high flow and saturation increased to 93% at 6 L     Laboratory workup shows elevated troponin and proBNP  Chest x-ray concerning for pneumonia  Patient started on ceftriaxone azithromycin  Patient received IV fluid hydration and 1 L IV fluid bolus  Lactic acid cultures obtained  Due to child's to VASc and new A-fib given dose of Lovenox patient saturation around 90s started on 4 L nasal cannula     Patient denies any nausea vomiting     No diarrhea no constipation no hematemesis no rectal bleeding no burning sensation by urination no UTI symptoms no flank pain  Patient denies any muscle weakness paralysis or loss of sensation no blurry vision no double vision no neck stiffness.  No recent traveling no history of exposure to sick contacts    Assessment/Plan:      Current Principal Problem:  Paroxysmal A-fib (HCC)    Atrial  Fibrillation with RVR   Elevated BNP   Elevated Troponin   - Elevated BNP and trop likely secondary to a. Fib   - Echo ordered   - Full dose Lovenox   - Amiodarone gtt   - Several IV pushes of metoprolol   - IV lasix 20 mg x1, will likely need additional doses tomorrow   - Cardiology consulted     Acute Hypoxic Respiratory Failure   - Secondary to PNA and volume overload   - IV lasix x1   - Continue Abx   - Wean O2 as able     Community Acquired Pneumonia   - CXR shows potential PNA   - Elevated WBC   - Continue ceftriaxone and doxycycline     Acute Kidney Injury   - Cr elevated at 1.5  - Secondary to atrial fibrillation vs volume overload  - Gustafson inserted for retention and accurate I&Os   - Avoid nephrotoxic medications  - Continue to monitor     Physical Exam Performed:      General appearance:  No acute distress, appears stated age and cooperative.  HEENT:  Normal cephalic, atraumatic without obvious deformity. Conjunctivae/corneas clear.  Neck: No jugular venous distention.    Respiratory:  Normal respiratory effort. Clear to auscultation, bilaterally without Rales/Wheezes/Rhonchi.  Cardiovascular:  Regular rate and rhythm without murmurs, rubs or gallops.  Abdomen: Soft, non-tender, non-distended with normal bowel sounds.  Musculoskeletal:  No clubbing, cyanosis. 2+ pitting edema bilaterally   Skin: Skin color, texture, turgor normal.  No rashes or lesions.  Neurologic:  Neurovascularly intact without any focal sensory/motor deficits.   Psychiatric:  Alert and oriented, thought content appropriate, normal insight      /78   Pulse (!) 115   Temp 97.7 °F (36.5 °C) (Oral)   Resp 20   Ht 1.93 m (6' 4\")   Wt 121.4 kg (267 lb 9.6 oz)   SpO2 94%   BMI 32.57 kg/m²     Diet: ADULT DIET; Regular    DVT Prophylaxis: []PPx LMWH  []SQ Heparin  []IPC/SCDs  []Eliquis  []Xarelto  []Coumadin  [x]Other -  full dose lovenox     Code status: Full Code    PT/OT Eval Status:   [x]NOT yet ordered  []Ordered and Pending

## 2024-08-23 NOTE — PLAN OF CARE
Problem: Discharge Planning  Goal: Discharge to home or other facility with appropriate resources  Outcome: Progressing  Flowsheets (Taken 8/22/2024 1825 by Giovanni Fang RN)  Discharge to home or other facility with appropriate resources:   Identify barriers to discharge with patient and caregiver   Arrange for needed discharge resources and transportation as appropriate     Problem: Pain  Goal: Verbalizes/displays adequate comfort level or baseline comfort level  Outcome: Progressing     Problem: Skin/Tissue Integrity  Goal: Absence of new skin breakdown  Description: 1.  Monitor for areas of redness and/or skin breakdown  2.  Assess vascular access sites hourly  3.  Every 4-6 hours minimum:  Change oxygen saturation probe site  4.  Every 4-6 hours:  If on nasal continuous positive airway pressure, respiratory therapy assess nares and determine need for appliance change or resting period.  Outcome: Progressing     Problem: Safety - Adult  Goal: Free from fall injury  Outcome: Progressing

## 2024-08-23 NOTE — PROGRESS NOTES
Physical Therapy  Facility/Department: [facility]  Physical Therapy Initial Assessment/Treatment     Name: Low Brooks  : 1940  MRN: [id number]  Date of Service: 2024    Discharge Recommendations:  Subacute/Skilled Nursing Facility   PT Equipment Recommendations  Equipment Needed: No  Other: Defer      Patient Diagnosis(es): The primary encounter diagnosis was Pneumonia of right lower lobe due to infectious organism. Diagnoses of Septicemia (HCC), Atrial fibrillation with rapid ventricular response (HCC), Atrial fibrillation, unspecified type (HCC), and Shortness of breath were also pertinent to this visit.  Past Medical History:  has a past medical history of Hyperlipidemia and Hypertension.  Past Surgical History:  has no past surgical history on file.    Assessment  Body Structures, Functions, Activity Limitations Requiring Skilled Therapeutic Intervention: Decreased functional mobility ;Decreased endurance;Decreased balance;Decreased safe awareness;Decreased ROM;Decreased cognition;Decreased posture;Decreased strength;Increased pain  Assessment: Pt to [facility] with diagnosis of paroxysmal a-fib. PTA pt lives in a bi-level house with his wife and he was independent with transfers and ambulation using a cane PRN. Pt currently presents below baseline function. Pt requires min A for bed mobility, mod Ax2 for first sit<>stand and min A for the 2nd and 3rd from and elevated bed. Pt able to pivot from bed>chair with min Ax2 and no AD. Increased time and education required to get pt up in the chair at end of session, pt eventually agreeable. Pt will continue to benefit from skilled PT services to address current deficits. It is rec pt DC to SNF when deemed medically appropriate.  Therapy Prognosis: Fair  Decision Making: Medium Complexity  Barriers to Learning: self limiting and willingness to learn  Requires PT Follow-Up: Yes  Activity Tolerance  Activity Tolerance: Patient tolerated evaluation without  A Lot  How much help is needed walking in hospital room?: Total  How much help is needed climbing 3-5 steps with a railing?: Total  AM-PAC Inpatient Mobility Raw Score : 12  AM-PAC Inpatient T-Scale Score : 35.33  Mobility Inpatient CMS 0-100% Score: 68.66  Mobility Inpatient CMS G-Code Modifier : CL    Goals  Short Term Goals  Time Frame for Short Term Goals: 8/30/24 (7 days) unless otherwise stated  Short Term Goal 1: Pt will perform supine <> sit with supervision  Short Term Goal 2: Pt will perform all transfers with LRAD and min A  Short Term Goal 3: Pt will ambulate 20ft with LRAD and min A  Short Term Goal 4: Pt will perform 10 reps of BLE exercises by 8/26/24  Patient Goals   Patient Goals : \"to go home\"       Education  Patient Education  Education Given To: Patient  Education Provided: Role of Therapy;Plan of Care;Transfer Training;Equipment;Family Education  Education Provided Comments: Disease Specific Education: Pt educated on importance of OOB mobility, prevention of complications of bedrest, and general safety during hospitalization.  Education Method: Verbal  Barriers to Learning: None  Education Outcome: Verbalized understanding;Continued education needed      Therapy Time   Individual Concurrent Group Co-treatment   Time In 1016         Time Out 1050         Minutes 34         Timed Code Treatment Minutes: 24 Minutes (10 min eval)       Jaquelin Cordova, PT, DPT    If pt is unable to be seen after this session, please let this note serve as discharge summary.  Please see case management note for discharge disposition.  Thank you.

## 2024-08-23 NOTE — PROGRESS NOTES
2005: Patient HR 120s-130s, Dilt gtt at 15 mL/hr with goal HR of 100bpm. Jhony Connors NP notified via perfect serve. One time dose of Metoprolol Injection 2.5mg ordered.     2218: Patient HR still 115-120s. Dilt gtt still at 15 with goal of <100 bpm. Jhony Connors NP notified via perfect serve. Dilt gitt d/c, Amio Gtt ordered. Cardiology consult ordered.

## 2024-08-23 NOTE — RT PROTOCOL NOTE
RT Inhaler-Nebulizer Bronchodilator Protocol Note    There is a bronchodilator order in the chart from a provider indicating to follow the RT Bronchodilator Protocol and there is an “Initiate RT Inhaler-Nebulizer Bronchodilator Protocol” order as well (see protocol at bottom of note).    CXR Findings:  XR CHEST PORTABLE    Result Date: 8/22/2024  1. Patchy consolidation of the right lung base concerning for pneumonia. Follow-up to resolution is recommended. 2. Cardiomegaly.       The findings from the last RT Protocol Assessment were as follows:   History Pulmonary Disease: None or smoker <15 pack years  Respiratory Pattern: Dyspnea on exertion or RR 21-25 bpm  Breath Sounds: Slightly diminished and/or crackles  Cough: Strong, spontaneous, non-productive  Indication for Bronchodilator Therapy: Decreased or absent breath sounds  Bronchodilator Assessment Score: 4    Aerosolized bronchodilator medication orders have been revised according to the RT Inhaler-Nebulizer Bronchodilator Protocol below.    Respiratory Therapist to perform RT Therapy Protocol Assessment initially then follow the protocol.  Repeat RT Therapy Protocol Assessment PRN for score 0-3 or on second treatment, BID, and PRN for scores above 3.    No Indications - adjust the frequency to every 6 hours PRN wheezing or bronchospasm, if no treatments needed after 48 hours then discontinue using Per Protocol order mode.     If indication present, adjust the RT bronchodilator orders based on the Bronchodilator Assessment Score as indicated below.  Use Inhaler orders unless patient has one or more of the following: on home nebulizer, not able to hold breath for 10 seconds, is not alert and oriented, cannot activate and use MDI correctly, or respiratory rate 25 breaths per minute or more, then use the equivalent nebulizer order(s) with same Frequency and PRN reasons based on the score.  If a patient is on this medication at home then do not decrease Frequency  below that used at home.    0-3 - enter or revise RT bronchodilator order(s) to equivalent RT Bronchodilator order with Frequency of every 4 hours PRN for wheezing or increased work of breathing using Per Protocol order mode.        4-6 - enter or revise RT Bronchodilator order(s) to two equivalent RT bronchodilator orders with one order with BID Frequency and one order with Frequency of every 4 hours PRN wheezing or increased work of breathing using Per Protocol order mode.        7-10 - enter or revise RT Bronchodilator order(s) to two equivalent RT bronchodilator orders with one order with TID Frequency and one order with Frequency of every 4 hours PRN wheezing or increased work of breathing using Per Protocol order mode.       11-13 - enter or revise RT Bronchodilator order(s) to one equivalent RT bronchodilator order with QID Frequency and an Albuterol order with Frequency of every 4 hours PRN wheezing or increased work of breathing using Per Protocol order mode.      Greater than 13 - enter or revise RT Bronchodilator order(s) to one equivalent RT bronchodilator order with every 4 hours Frequency and an Albuterol order with Frequency of every 2 hours PRN wheezing or increased work of breathing using Per Protocol order mode.     RT to enter RT Home Evaluation for COPD & MDI Assessment order using Per Protocol order mode.    Electronically signed by Angelina Villela RCP on 8/23/2024 at 7:10 PM

## 2024-08-23 NOTE — CONSULTS
atrial fibrillation for few days to weeks. BNP very elevated. Lactate elevated today. No hypotension noted. Received some IV diuresis (continue intermittently and monitor renal function).     Would obtain an echocardiogram to assess overall cardiac structure and function. Would continue IV amiodarone and also start oral amiodarone to slow ventricular rates and also in anticipation of possible LIAM-guided cardioversion. Continue LMWH anticoagulation for now.     Troponin mildly elevated with flat trend. Likely demand ischemia. Will evaluate for any regional wall motion abnormalities on echo.     2. Hypertension: good overall blood pressure control.     3. Pneumonia: abnormal CXR. On IV antibiotics. Management as per primary team.    Plan:     1. Echocardiogram  2. Continue amiodarone drip  3. Overlap with amiodarone 200 mg BID  4. Possible LIAM-cardioversion Monday if remains in atrial fibrillation     Subjective:     History of Present Illness:    Patient is a 83 y.o. male with past medical history significant for hypertension and dyslipidemia. No prior history of heart diease or arrhythmia.    Presented to hospital due to generalized weakness and shortness of breath. Was found in ED to be in atrial fibrillation and to have evidence of pneumonia. Started on IV amiodarone.     As per wife, patient had a mechanical fall about 6 weeks ago and has not been the same since then. He was referred for physical therapy but declined.     Denies any significant dizziness or episodes of syncope.     Denies fever or chills. Mild cough/no phlegm.    Denies leg swelling (occasional ankle swelling). No reported bleeding issues.    No reported snoring. Not previously tested for obstructive sleep apnea     No alcohol  Ex-smoker  Mild coffee intake    Denies any family history of heart disease    Problem List:  Patient Active Problem List   Diagnosis    Paroxysmal A-fib (HCC)       History:  Past Medical History:   Diagnosis Date        Eyes:  Negative for vision loss in left eye and vision loss in right eye.   Cardiovascular:  Positive for dyspnea on exertion and leg swelling. Negative for chest pain and syncope.   Respiratory:  Positive for cough and shortness of breath. Negative for wheezing.    Hematologic/Lymphatic: Negative for bleeding problem. Does not bruise/bleed easily.   Skin:  Negative for itching and rash.   Musculoskeletal:  Positive for falls. Negative for joint pain and joint swelling.   Gastrointestinal:  Negative for hematemesis and hematochezia.   Genitourinary:  Negative for dysuria and hematuria.   Neurological:  Negative for dizziness and light-headedness.   Psychiatric/Behavioral:  Negative for altered mental status. The patient is not nervous/anxious.        Objective:     Vital Signs (last 24 hours):  Patient Vitals for the past 24 hrs:   BP Temp Temp src Pulse Resp SpO2 Height Weight   08/23/24 1151 -- 97.7 °F (36.5 °C) Oral -- 20 94 % -- --   08/23/24 1023 129/78 -- -- (!) 115 -- 93 % -- --   08/23/24 0800 -- 97.4 °F (36.3 °C) Oral (!) 117 24 -- -- --   08/23/24 0743 -- -- -- -- -- 95 % -- --   08/23/24 0446 125/77 97.5 °F (36.4 °C) Oral (!) 109 28 93 % -- --   08/23/24 0300 -- -- -- -- -- -- -- 121.4 kg (267 lb 9.6 oz)   08/23/24 0133 110/80 -- -- (!) 124 -- -- -- --   08/23/24 0109 -- -- -- (!) 120 -- -- -- --   08/23/24 0057 128/65 -- -- -- -- -- -- --   08/23/24 0044 106/73 -- -- (!) 109 -- -- -- --   08/22/24 2358 (!) 136/92 -- -- (!) 130 -- -- -- --   08/22/24 2246 126/84 97.7 °F (36.5 °C) Oral (!) 118 30 92 % -- --   08/22/24 2158 120/76 -- -- -- -- -- -- --   08/22/24 2143 123/81 -- -- -- -- -- -- --   08/22/24 2002 118/85 98 °F (36.7 °C) Oral (!) 135 30 94 % -- --   08/22/24 1815 122/72 98.1 °F (36.7 °C) Oral (!) 131 -- 90 % -- --   08/22/24 1750 (!) 121/98 97.6 °F (36.4 °C) Oral (!) 131 (!) 40 93 % -- --   08/22/24 1728 132/79 -- -- (!) 137 (!) 46 92 % -- --   08/22/24 1705 (!) 130/92 -- -- (!) 144 (!) 46 92   4 mg Oral Q8H PRN Elsi Flores MD        Or    ondansetron (ZOFRAN) injection 4 mg  4 mg IntraVENous Q6H PRN Elsi Flores MD        polyethylene glycol (GLYCOLAX) packet 17 g  17 g Oral Daily PRN Elsi Flores MD        acetaminophen (TYLENOL) tablet 650 mg  650 mg Oral Q6H PRN Elsi Flores MD        Or    acetaminophen (TYLENOL) suppository 650 mg  650 mg Rectal Q6H PRN Elsi Flores MD        cefTRIAXone (ROCEPHIN) 1,000 mg in sodium chloride 0.9 % 50 mL IVPB (mini-bag)  1,000 mg IntraVENous Q24H Elsi Flores MD   Stopped at 08/23/24 1425    enoxaparin (LOVENOX) injection 120 mg  1 mg/kg (Order-Specific) SubCUTAneous BID Elsi Flores MD   120 mg at 08/23/24 0829    ipratropium 0.5 mg-albuterol 2.5 mg (DUONEB) nebulizer solution 1 Dose  1 Dose Inhalation Q4H WA RT Elsi Flores MD   1 Dose at 08/23/24 1151    guaiFENesin (MUCINEX) extended release tablet 600 mg  600 mg Oral BID Elsi Flores MD   600 mg at 08/23/24 0829    doxycycline hyclate (VIBRA-TABS) tablet 100 mg  100 mg Oral 2 times per day Elsi Flores MD   100 mg at 08/23/24 0829    amiodarone (CORDARONE) 450 mg in dextrose 5 % 250 mL infusion  0.5 mg/min IntraVENous Continuous Jhony Connors APRN - CNP 16.7 mL/hr at 08/23/24 0508 0.5 mg/min at 08/23/24 0508         ECG Interpretation:  (Date: 8/23/2024)  Rhythm: Atrial fibrillation  Rate: 152 BPM  PAC's / PVC's present: PVC/PVC's  Conduction abnormalities:  none  Axis: leftward axis        Echocardiogram:    No results found for this or any previous visit.    Telemetry:    atrial fibrillation with rapid ventricular response     Lab Review:    Recent Labs     08/22/24  1512 08/23/24  0510   WBC 18.2* 12.2*   HGB 12.0* 11.7*   HCT 36.6* 36.5*   MCV 83.5 83.5   * 553*       Recent Labs     08/22/24  1512 08/23/24  0510    138   K 4.2 4.1    108   CO2 19* 17*   BUN 54* 69*   CREATININE 1.4* 1.5*       Recent

## 2024-08-23 NOTE — PROGRESS NOTES
Speech Language Pathology  Clinical Bedside Swallow Assessment  Facility/Department: [facility] [address] [facility]        Recommendations:  Diet recommendation: IDDSI 7 Regular Solids; IDDSI 0 Thin Liquids; Meds PO as tolerated  Instrumentation: MBSS is recommended to further assess oropharyngeal structures and functions , patient declining today despite education  Risk management: upright for all intake, small bites/sips, close supervision, oral care 2-3x/day to reduce adverse affects in the event of aspiration, slow rate of intake, STRICT aspiration precautions, and hold PO and contact SLP if s/s of aspiration or worsening respiratory status develop.  Patient at high risk for aspiration, recommended MBS or FEES which patient declined this date, agreed to consider at a later date. Recommend continue current diet with STRICT aspiration precautions, FREQUENT ORAL HYGIENE. Hold PO and consult ST if increased s/s respiratory distress.    NAME:Low Brooks  : 1940 (83 y.o.)   MRN: [id number]  ROOM: [address] [place] [address]  ADMISSION DATE: 2024  PATIENT DIAGNOSIS(ES): Paroxysmal A-fib (HCC) [I48.0]  Chief Complaint   Patient presents with    Fatigue    Shortness of Breath     Patient arrives from home C/O generalized weakness and SOB. Patient reports signs and symptoms have been ongoing for \"months.\" Patient denies chest pain, denies N/V. EMS reports patient does not wear oxygen at baseline, 86% RA, placed on 2L NC initially but titrated to 4L NC. Patient poor historian, unknown medical and surgical history.      Patient Active Problem List    Diagnosis Date Noted    Paroxysmal A-fib (HCC) 2024     Past Medical History:   Diagnosis Date    Hyperlipidemia     Hypertension      No past surgical history on file.  Not on File    DATE ONSET: 2024    Date of Evaluation: 2024   Evaluating Therapist: Laura Salazar, SLP    Chart Reviewed: : [x] Yes [] No     Pain: The patient does not complain of pain     Current

## 2024-08-23 NOTE — PROGRESS NOTES
0000: Patients HR still in the 120s-130s on Amio gtt at 33.3. Call out to Cardiology.     0008: JUAN CARLOS Phillips returned call. Telephone with read back order for Metoprolol Injection 5mg. If HR stays elevated in 30 minutes after administrating, give another Metoprolol 5mg injection.

## 2024-08-24 ENCOUNTER — APPOINTMENT (OUTPATIENT)
Dept: GENERAL RADIOLOGY | Age: 84
DRG: 193 | End: 2024-08-24
Payer: MEDICARE

## 2024-08-24 LAB
ANION GAP SERPL CALCULATED.3IONS-SCNC: 11 MMOL/L (ref 3–16)
BASOPHILS # BLD: 0 K/UL (ref 0–0.2)
BASOPHILS NFR BLD: 0.2 %
BUN SERPL-MCNC: 81 MG/DL (ref 7–20)
CALCIUM SERPL-MCNC: 8.3 MG/DL (ref 8.3–10.6)
CHLORIDE SERPL-SCNC: 108 MMOL/L (ref 99–110)
CO2 SERPL-SCNC: 18 MMOL/L (ref 21–32)
CREAT SERPL-MCNC: 1.4 MG/DL (ref 0.8–1.3)
DEPRECATED RDW RBC AUTO: 14.7 % (ref 12.4–15.4)
EOSINOPHIL # BLD: 0 K/UL (ref 0–0.6)
EOSINOPHIL NFR BLD: 0 %
GFR SERPLBLD CREATININE-BSD FMLA CKD-EPI: 50 ML/MIN/{1.73_M2}
GLUCOSE SERPL-MCNC: 142 MG/DL (ref 70–99)
HCT VFR BLD AUTO: 31.9 % (ref 40.5–52.5)
HGB BLD-MCNC: 10.3 G/DL (ref 13.5–17.5)
LYMPHOCYTES # BLD: 0.8 K/UL (ref 1–5.1)
LYMPHOCYTES NFR BLD: 4 %
MCH RBC QN AUTO: 26.7 PG (ref 26–34)
MCHC RBC AUTO-ENTMCNC: 32.2 G/DL (ref 31–36)
MCV RBC AUTO: 83 FL (ref 80–100)
MONOCYTES # BLD: 1.2 K/UL (ref 0–1.3)
MONOCYTES NFR BLD: 5.9 %
NEUTROPHILS # BLD: 18.1 K/UL (ref 1.7–7.7)
NEUTROPHILS NFR BLD: 89.9 %
PLATELET # BLD AUTO: 544 K/UL (ref 135–450)
PMV BLD AUTO: 7.2 FL (ref 5–10.5)
POTASSIUM SERPL-SCNC: 3.8 MMOL/L (ref 3.5–5.1)
RBC # BLD AUTO: 3.85 M/UL (ref 4.2–5.9)
SODIUM SERPL-SCNC: 137 MMOL/L (ref 136–145)
WBC # BLD AUTO: 20.2 K/UL (ref 4–11)

## 2024-08-24 PROCEDURE — 2580000003 HC RX 258: Performed by: INTERNAL MEDICINE

## 2024-08-24 PROCEDURE — 6360000002 HC RX W HCPCS: Performed by: NURSE PRACTITIONER

## 2024-08-24 PROCEDURE — 71045 X-RAY EXAM CHEST 1 VIEW: CPT

## 2024-08-24 PROCEDURE — 6370000000 HC RX 637 (ALT 250 FOR IP): Performed by: INTERNAL MEDICINE

## 2024-08-24 PROCEDURE — 94761 N-INVAS EAR/PLS OXIMETRY MLT: CPT

## 2024-08-24 PROCEDURE — 6360000002 HC RX W HCPCS: Performed by: INTERNAL MEDICINE

## 2024-08-24 PROCEDURE — 6370000000 HC RX 637 (ALT 250 FOR IP)

## 2024-08-24 PROCEDURE — 2500000003 HC RX 250 WO HCPCS: Performed by: INTERNAL MEDICINE

## 2024-08-24 PROCEDURE — 2060000000 HC ICU INTERMEDIATE R&B

## 2024-08-24 PROCEDURE — 99232 SBSQ HOSP IP/OBS MODERATE 35: CPT

## 2024-08-24 PROCEDURE — 51702 INSERT TEMP BLADDER CATH: CPT

## 2024-08-24 PROCEDURE — 94640 AIRWAY INHALATION TREATMENT: CPT

## 2024-08-24 PROCEDURE — 85025 COMPLETE CBC W/AUTO DIFF WBC: CPT

## 2024-08-24 PROCEDURE — 80048 BASIC METABOLIC PNL TOTAL CA: CPT

## 2024-08-24 PROCEDURE — 2700000000 HC OXYGEN THERAPY PER DAY

## 2024-08-24 PROCEDURE — 2580000003 HC RX 258: Performed by: NURSE PRACTITIONER

## 2024-08-24 RX ORDER — AMIODARONE HYDROCHLORIDE 200 MG/1
400 TABLET ORAL 2 TIMES DAILY
Status: DISCONTINUED | OUTPATIENT
Start: 2024-08-24 | End: 2024-08-25

## 2024-08-24 RX ORDER — IPRATROPIUM BROMIDE AND ALBUTEROL SULFATE 2.5; .5 MG/3ML; MG/3ML
1 SOLUTION RESPIRATORY (INHALATION)
Status: DISCONTINUED | OUTPATIENT
Start: 2024-08-24 | End: 2024-08-24

## 2024-08-24 RX ORDER — ALBUTEROL SULFATE 0.83 MG/ML
2.5 SOLUTION RESPIRATORY (INHALATION) EVERY 4 HOURS PRN
Status: DISCONTINUED | OUTPATIENT
Start: 2024-08-24 | End: 2024-08-28 | Stop reason: HOSPADM

## 2024-08-24 RX ORDER — SODIUM CHLORIDE, SODIUM LACTATE, POTASSIUM CHLORIDE, CALCIUM CHLORIDE 600; 310; 30; 20 MG/100ML; MG/100ML; MG/100ML; MG/100ML
INJECTION, SOLUTION INTRAVENOUS CONTINUOUS
Status: DISCONTINUED | OUTPATIENT
Start: 2024-08-24 | End: 2024-08-24

## 2024-08-24 RX ORDER — IPRATROPIUM BROMIDE AND ALBUTEROL SULFATE 2.5; .5 MG/3ML; MG/3ML
1 SOLUTION RESPIRATORY (INHALATION)
Status: DISCONTINUED | OUTPATIENT
Start: 2024-08-25 | End: 2024-08-28

## 2024-08-24 RX ADMIN — ENOXAPARIN SODIUM 120 MG: 150 INJECTION SUBCUTANEOUS at 08:16

## 2024-08-24 RX ADMIN — DOXYCYCLINE HYCLATE 100 MG: 100 TABLET, COATED ORAL at 08:16

## 2024-08-24 RX ADMIN — IPRATROPIUM BROMIDE AND ALBUTEROL SULFATE 1 DOSE: 2.5; .5 SOLUTION RESPIRATORY (INHALATION) at 20:59

## 2024-08-24 RX ADMIN — ENOXAPARIN SODIUM 120 MG: 150 INJECTION SUBCUTANEOUS at 20:43

## 2024-08-24 RX ADMIN — SODIUM BICARBONATE: 84 INJECTION, SOLUTION INTRAVENOUS at 12:34

## 2024-08-24 RX ADMIN — GUAIFENESIN 600 MG: 600 TABLET ORAL at 20:43

## 2024-08-24 RX ADMIN — GUAIFENESIN 600 MG: 600 TABLET ORAL at 08:16

## 2024-08-24 RX ADMIN — AMIODARONE HYDROCHLORIDE 0.5 MG/MIN: 50 INJECTION, SOLUTION INTRAVENOUS at 12:34

## 2024-08-24 RX ADMIN — IPRATROPIUM BROMIDE AND ALBUTEROL SULFATE 1 DOSE: 2.5; .5 SOLUTION RESPIRATORY (INHALATION) at 08:39

## 2024-08-24 RX ADMIN — DOXYCYCLINE HYCLATE 100 MG: 100 TABLET, COATED ORAL at 20:43

## 2024-08-24 RX ADMIN — IPRATROPIUM BROMIDE AND ALBUTEROL SULFATE 1 DOSE: 2.5; .5 SOLUTION RESPIRATORY (INHALATION) at 13:52

## 2024-08-24 RX ADMIN — AMIODARONE HYDROCHLORIDE 400 MG: 200 TABLET ORAL at 08:16

## 2024-08-24 RX ADMIN — AMIODARONE HYDROCHLORIDE 400 MG: 200 TABLET ORAL at 20:43

## 2024-08-24 RX ADMIN — SODIUM CHLORIDE, POTASSIUM CHLORIDE, SODIUM LACTATE AND CALCIUM CHLORIDE: 600; 310; 30; 20 INJECTION, SOLUTION INTRAVENOUS at 06:40

## 2024-08-24 RX ADMIN — CEFTRIAXONE SODIUM 1000 MG: 1 INJECTION, POWDER, FOR SOLUTION INTRAMUSCULAR; INTRAVENOUS at 08:35

## 2024-08-24 ASSESSMENT — PAIN SCALES - GENERAL
PAINLEVEL_OUTOF10: 0

## 2024-08-24 NOTE — PLAN OF CARE
Problem: Discharge Planning  Goal: Discharge to home or other facility with appropriate resources  8/24/2024 1024 by Giovanni Fang RN  Outcome: Progressing  Flowsheets (Taken 8/24/2024 0815)  Discharge to home or other facility with appropriate resources:   Identify barriers to discharge with patient and caregiver   Arrange for needed discharge resources and transportation as appropriate  8/24/2024 0530 by Samreen Perera RN  Outcome: Progressing     Problem: Pain  Goal: Verbalizes/displays adequate comfort level or baseline comfort level  8/24/2024 1024 by Giovanni Fang RN  Outcome: Progressing  Flowsheets (Taken 8/24/2024 0730)  Verbalizes/displays adequate comfort level or baseline comfort level:   Encourage patient to monitor pain and request assistance   Assess pain using appropriate pain scale  8/24/2024 0530 by Samreen Perera RN  Outcome: Progressing     Problem: Skin/Tissue Integrity  Goal: Absence of new skin breakdown  Description: 1.  Monitor for areas of redness and/or skin breakdown  2.  Assess vascular access sites hourly  3.  Every 4-6 hours minimum:  Change oxygen saturation probe site  4.  Every 4-6 hours:  If on nasal continuous positive airway pressure, respiratory therapy assess nares and determine need for appliance change or resting period.  8/24/2024 1024 by Giovanni Fang RN  Outcome: Progressing  8/24/2024 0530 by Samreen Perera RN  Outcome: Progressing     Problem: Safety - Adult  Goal: Free from fall injury  8/24/2024 1024 by Giovanni Fang RN  Outcome: Progressing  8/24/2024 0530 by Samreen Perera RN  Outcome: Progressing

## 2024-08-24 NOTE — RT PROTOCOL NOTE
RT Nebulizer Bronchodilator Protocol Note    There is a bronchodilator order in the chart from a provider indicating to follow the RT Bronchodilator Protocol and there is an “Initiate RT Bronchodilator Protocol” order as well (see protocol at bottom of note).    CXR Findings:  XR CHEST PORTABLE    Result Date: 8/22/2024  1. Patchy consolidation of the right lung base concerning for pneumonia. Follow-up to resolution is recommended. 2. Cardiomegaly.       The findings from the last RT Protocol Assessment were as follows:  Smoking: None or smoker <15 pack years  Respiratory Pattern: Mild dyspnea at rest, irregular pattern, or RR 21-25 bpm  Breath Sounds: Inspiratory and expiratory or bilateral wheezing and/or rhonchi  Cough: Strong, spontaneous, non-productive  Indication for Bronchodilator Therapy: Decreased or absent breath sounds  Bronchodilator Assessment Score: 10    Aerosolized bronchodilator medication orders have been revised according to the RT Nebulizer Bronchodilator Protocol below.    Respiratory Therapist to perform RT Therapy Protocol Assessment initially then follow the protocol.  Repeat RT Therapy Protocol Assessment PRN for score 0-3 or on second treatment, BID, and PRN for scores above 3.    No Indications - adjust the frequency to every 6 hours PRN wheezing or bronchospasm, if no treatments needed after 48 hours then discontinue using Per Protocol order mode.     If indication present, adjust the RT bronchodilator orders based on the Bronchodilator Assessment Score as indicated below.  If a patient is on this medication at home then do not decrease Frequency below that used at home.    0-3 - enter or revise RT bronchodilator order(s) to equivalent RT Bronchodilator order with Frequency of every 4 hours PRN for wheezing or increased work of breathing using Per Protocol order mode.       4-6 - enter or revise RT Bronchodilator order(s) to two equivalent RT bronchodilator orders with one order with BID

## 2024-08-24 NOTE — PROGRESS NOTES
[facility]     Electrophysiology                                     Progress Note    Admission date:  2024    Reason for follow up visit: Atrial fibrillation/Acute systolic heart failure    HPI/CC: Low Brooks was admitted on 2024 with generalized weakness and shortness of breath.  Patient was found to be in rapid atrial fibrillation and possible pneumonia patient was started on IV amiodarone.  Echo revealed LVEF 25-30% of note patient had mechanical fall approximately 6 weeks ago and his wife says he has \"gone downhill\" since that time.  He was seen at PCP however wife states ECG was not performed.  Wife states patient has not been very mobile at home likely contributing to the pneumonia.  Patient also appears to be in acute systolic heart failure.  proBNP over 15,000.  WBC 18.2.  Procalcitonin mildly elevated at 0.39.  Lactic acid 1.9 which gradually increased to 2.1 (normal lactic acid is 0.4-2.0).  BUN 54 creatinine 1.4, troponin 43 and 44.  24 BUN 81 creatinine 1.4.  Nephrology consulted and started IV fluids    Rhythm has been rapid atrial fibrillation rate 110s    Subjective: Patient sitting in bed.  Patient appears extremely short of breath.  He is on O2 at 7 L per nasal cannula.  Respiratory rate in the 30s.  Will order stat portable chest x-ray.  Patient appears volume overloaded.  Newly depressed LVEF 25% in rapid atrial fibrillation.  proBNP this admission over 15,000. Recommend discontinuing IVFs    Vitals:  Blood pressure 135/77, pulse (!) 109, temperature 97.7 °F (36.5 °C), temperature source Oral, resp. rate 20, height 1.93 m (6' 4\"), weight 121.2 kg (267 lb 3.2 oz), SpO2 95%.  Temp  Av.8 °F (36.6 °C)  Min: 97.6 °F (36.4 °C)  Max: 98 °F (36.7 °C)  Pulse  Av.4  Min: 109  Max: 116  BP  Min: 130/80  Max: 135/77  SpO2  Av.6 %  Min: 93 %  Max: 95 %    24 hour I/O    Intake/Output Summary (Last 24 hours) at 2024 1341  Last data filed at 2024  regurgitation.    Mitral Valve: Mild regurgitation.    Tricuspid Valve: Mild regurgitation. RVSP may be underestimated in the setting of poor visualization of TR jet. The estimated RVSP is 23 mmHg.    Pericardium: There is evidence of epicardial fat. Trivial localized pericardial effusion present around the left ventricle. No indication of cardiac tamponade.    Conclusion: Findings are consistent with dilated cardiomyopathy.  No prior echocardiograms for comparison.    All labs and testing reviewed.  Lab Review     Renal Profile:   Lab Results   Component Value Date/Time    CREATININE 1.4 08/24/2024 05:17 AM    BUN 81 08/24/2024 05:17 AM     08/24/2024 05:17 AM    K 3.8 08/24/2024 05:17 AM     08/24/2024 05:17 AM    CO2 18 08/24/2024 05:17 AM     CBC:    Lab Results   Component Value Date/Time    WBC 20.2 08/24/2024 05:17 AM    RBC 3.85 08/24/2024 05:17 AM    HGB 10.3 08/24/2024 05:17 AM    HCT 31.9 08/24/2024 05:17 AM    MCV 83.0 08/24/2024 05:17 AM    RDW 14.7 08/24/2024 05:17 AM     08/24/2024 05:17 AM     BNP:  No results found for: \"BNP\"  Fasting Lipid Panel:  No results found for: \"CHOL\", \"HDL\", \"TRIG\"  Cardiac Enzymes:  CK/MbTroponinNo results found for: \"CKTOTAL\", \"CKMB\", \"CKMBINDEX\", \"TROPONINI\"  PT/ INR   Lab Results   Component Value Date/Time    INR 1.27 08/22/2024 03:12 PM    PROTIME 16.1 08/22/2024 03:12 PM     PTT No components found for: \"PTT\" No results found for: \"MG\" No results found for: \"TSH\"    Assessment:  New onset atrial fibrillation   -[id number] score 3-age and hypertension  Acute systolic heart failure   -proBNP 15,207  New Nonischemic cardiomyopathy   -LVEF 2530%  CKD-creatinine 1.4--baseline creatinine~1.4--Labs in Care Everywhere  Elevated BUN likely heart failure related  Hypertension  Hyperlipidemia  Moderate aortic regurgitation  CAP-antibiotics per primary team  Transaminitis--likely hepatic congestion--liver enzymes normal 7/5/2020-in Care Everywhere    Plan:    Continue amiodarone drip at 0.5 mg/min  Increase amiodarone 400 mg twice daily  Start metoprolol tartrate 25 mg twice daily  Portable chest x-ray stat  Transaminitis -likely hepatic congestion from HF--likely needs IV diuresis  Continue to monitor on telemetry  Daily labs and replace electrolytes as needed  Daily weights and strict I's and O's  Low-sodium diet  Discussed with patient, wife, Dr Ruvalcaba and nursing      Kim Moctezuma APRDEEPAK-CNP  [facility]  [phone]

## 2024-08-24 NOTE — CONSULTS
[facility] [address] [place] [address]                              CONSULTATION      PATIENT NAME: LUCIA ROSA            : 1940  MED REC NO: [id number]                      ROOM: 0449  ACCOUNT NO: [id number]                       ADMIT DATE: 2024  PROVIDER: Rosy Lynch MD      CONSULT DATE: 2024    REASON FOR CONSULTATION:  Acute kidney injury.    HISTORY OF PRESENT ILLNESS:  The patient is an 83-year-old  male patient who presented to [facility] complaining of generalized weakness and progressive fatigue.  Upon presentation, he was noted to be in atrial fibrillation with rapid ventricular response.  He was also noted to have an acute kidney injury with a serum creatinine of 1.4-1.5 mg/dL, which prompted Nephrology consultation.    PAST MEDICAL HISTORY:    1. Hypertension.  2. Hyperlipidemia.    PAST SURGICAL HISTORY:  None.    ALLERGIES:  NO KNOWN DRUG ALLERGIES.      SOCIAL HISTORY:  The patient does not smoke or drink alcohol.    FAMILY HISTORY:  Negative for kidney disease.    REVIEW OF SYSTEMS:  The patient denied any nausea, vomiting, or abdominal pain.  Otherwise, a 10-point review of system was relatively unremarkable.    PHYSICAL EXAMINATION:  VITAL SIGNS:  Blood pressure 135/77, heart rate 109, respirations 20, temperature 97.7 Fahrenheit.  The patient is saturating 95% on 7 liters nasal cannula.  GENERAL APPEARANCE:  The patient is alert and oriented x3, not in acute distress.  HEENT:  Eyes reveal normal conjunctivae.  Reactive pupils.  NECK:  Midline trachea.  Nonpalpable thyroid.  LUNGS:  Clear to anterior auscultation bilaterally.  Nonlabored breathing.  CARDIOVASCULAR:  S1, S2.  Irregular rate and rhythm.  No murmurs or gallops.  No peripheral edema.  ABDOMEN:  Soft abdomen.  No organomegaly.  SKIN:  No lesion or rash.  Warm to touch.  PSYCHIATRIC:  Good judgment and  insight.  LYMPHATICS:  No cervical or axillary adenopathies.    ASSESSMENT AND PLAN:  Acute kidney injury, likely secondary to a prerenal component, although a nonoliguric acute tubular necrosis injury cannot be ruled out.    RECOMMENDATIONS:    1. Continue volume expansion.  2. Avoid all nephrotoxic agents at this time.  3. Maintain systolic blood pressure above 120 mmHg.  4. No significant electrolyte disorders noted.  5. Hypertension.  Blood pressure within acceptable range.  6. Anemia.  Stable hemoglobin.  Monitor.  7. Atrial fibrillation.  Management per Cardiology.          RONALD GUTIERREZ MD      D:  08/24/2024 12:57:04     T:  08/24/2024 13:52:37     SHIELA/KATT  Job #:  [id number]     Doc#:  [id number]

## 2024-08-24 NOTE — PROGRESS NOTES
Other - Amiodarone gtt   [] Change in code status:    [] Decision to escalate care:    [] Major surgery/procedure with associated risk factors:    ----------------------------------------------------------------------  C. Data (any 2)  [] Discussed management of the case with consultants as follows:    [x] Discussed the discharge plan in detail with case mgt including timing/barriers to discharge, need for support services and placement decision   [] Imaging personally reviewed and interpreted, includes:   [] Telemetry monitoring as noted above  [x] Data Review (any 3)  [] Collateral history obtained from:    [x] All available Consultant notes from yesterday/today were reviewed  [x] All current labs were reviewed and interpreted for clinical significance   [x] Appropriate follow-up labs were ordered    Medications:  Personally reviewed in detail in conjunction w/ labs as documented for evidence of drug toxicity.     Infusion Medications    sodium bicarbonate 75 mEq in sodium chloride 0.45 % 1,000 mL infusion 75 mL/hr at 08/24/24 1234    sodium chloride      amiodarone 0.5 mg/min (08/24/24 1234)     Scheduled Medications    amiodarone  400 mg Oral BID    ipratropium 0.5 mg-albuterol 2.5 mg  1 Dose Inhalation TID RT    sodium chloride flush  5-40 mL IntraVENous 2 times per day    cefTRIAXone (ROCEPHIN) IV  1,000 mg IntraVENous Q24H    enoxaparin  1 mg/kg (Order-Specific) SubCUTAneous BID    guaiFENesin  600 mg Oral BID    doxycycline  100 mg Oral 2 times per day     PRN Meds: albuterol, sodium chloride flush, sodium chloride, ondansetron **OR** ondansetron, polyethylene glycol, acetaminophen **OR** acetaminophen     Labs:  Personally reviewed and interpreted for clinical significance.     Recent Labs     08/22/24  1512 08/23/24  0510 08/24/24  0517   WBC 18.2* 12.2* 20.2*   HGB 12.0* 11.7* 10.3*   HCT 36.6* 36.5* 31.9*   * 553* 544*     Recent Labs     08/22/24  1512 08/23/24  0510 08/24/24  0517    138  137   K 4.2 4.1 3.8    108 108   CO2 19* 17* 18*   BUN 54* 69* 81*   CREATININE 1.4* 1.5* 1.4*   CALCIUM 8.6 8.4 8.3     Recent Labs     08/22/24  1512 08/22/24  1605   PROBNP 15,207*  --    TROPHS 43* 44*     No results for input(s): \"LABA1C\" in the last 72 hours.  Recent Labs     08/22/24  1512 08/23/24  0510   AST 36 40*   ALT 53* 52*   BILITOT 0.6 0.4   ALKPHOS 287* 269*     Recent Labs     08/22/24  1512 08/22/24  2056 08/23/24  0510 08/23/24  0856 08/23/24  1301   INR 1.27*  --   --   --   --    LACTA  --    < > 1.9 1.7 2.1*    < > = values in this interval not displayed.       Urine Cultures: No results found for: \"LABURIN\"  Blood Cultures:   Lab Results   Component Value Date/Time    BC  08/22/2024 04:36 PM     No Growth to date.  Any change in status will be called.     Lab Results   Component Value Date/Time    BLOODCULT2  08/22/2024 04:36 PM     No Growth to date.  Any change in status will be called.     Organism: No results found for: \"ORG\"      Radha Tan, DO     Parent

## 2024-08-24 NOTE — CONSULTS
Patient seen and examined, consult note dictated.    Assessment and Plan:    1- TRI: Likely secondary to a pre-renal component, although a non oliguric ATN injury cannot be ruled out.  - Continue volume expansion.  - Avoid all nephrotoxic agents at this time.  - Maintain systolic blood pressure > 120 mmHg.    2- No significant electrolytes disorders noted.    3- HTN: Blood pressure within acceptable range.    4- Anemia: Stable hemoglobin, monitor.    5- Atrial fibrillation: Management per Cardiology.

## 2024-08-25 LAB
ALBUMIN SERPL-MCNC: 2.5 G/DL (ref 3.4–5)
ALP SERPL-CCNC: 199 U/L (ref 40–129)
ALT SERPL-CCNC: 59 U/L (ref 10–40)
ANION GAP SERPL CALCULATED.3IONS-SCNC: 8 MMOL/L (ref 3–16)
AST SERPL-CCNC: 47 U/L (ref 15–37)
BASOPHILS # BLD: 0 K/UL (ref 0–0.2)
BASOPHILS NFR BLD: 0.1 %
BILIRUB DIRECT SERPL-MCNC: 0.2 MG/DL (ref 0–0.3)
BILIRUB INDIRECT SERPL-MCNC: 0.1 MG/DL (ref 0–1)
BILIRUB SERPL-MCNC: 0.3 MG/DL (ref 0–1)
BUN SERPL-MCNC: 70 MG/DL (ref 7–20)
CALCIUM SERPL-MCNC: 7.9 MG/DL (ref 8.3–10.6)
CHLORIDE SERPL-SCNC: 111 MMOL/L (ref 99–110)
CO2 SERPL-SCNC: 20 MMOL/L (ref 21–32)
CREAT SERPL-MCNC: 1.3 MG/DL (ref 0.8–1.3)
DEPRECATED RDW RBC AUTO: 14.5 % (ref 12.4–15.4)
EOSINOPHIL # BLD: 0 K/UL (ref 0–0.6)
EOSINOPHIL NFR BLD: 0 %
GFR SERPLBLD CREATININE-BSD FMLA CKD-EPI: 55 ML/MIN/{1.73_M2}
GLUCOSE SERPL-MCNC: 127 MG/DL (ref 70–99)
HCT VFR BLD AUTO: 30.7 % (ref 40.5–52.5)
HGB BLD-MCNC: 10 G/DL (ref 13.5–17.5)
LYMPHOCYTES # BLD: 1.1 K/UL (ref 1–5.1)
LYMPHOCYTES NFR BLD: 6.5 %
MAGNESIUM SERPL-MCNC: 2.6 MG/DL (ref 1.8–2.4)
MCH RBC QN AUTO: 27.1 PG (ref 26–34)
MCHC RBC AUTO-ENTMCNC: 32.7 G/DL (ref 31–36)
MCV RBC AUTO: 82.8 FL (ref 80–100)
MONOCYTES # BLD: 1.2 K/UL (ref 0–1.3)
MONOCYTES NFR BLD: 7.3 %
NEUTROPHILS # BLD: 14 K/UL (ref 1.7–7.7)
NEUTROPHILS NFR BLD: 86.1 %
NT-PROBNP SERPL-MCNC: 6749 PG/ML (ref 0–449)
PLATELET # BLD AUTO: 555 K/UL (ref 135–450)
PMV BLD AUTO: 7.1 FL (ref 5–10.5)
POTASSIUM SERPL-SCNC: 3.8 MMOL/L (ref 3.5–5.1)
PROT SERPL-MCNC: 5.9 G/DL (ref 6.4–8.2)
RBC # BLD AUTO: 3.7 M/UL (ref 4.2–5.9)
SODIUM SERPL-SCNC: 139 MMOL/L (ref 136–145)
WBC # BLD AUTO: 16.3 K/UL (ref 4–11)

## 2024-08-25 PROCEDURE — 51702 INSERT TEMP BLADDER CATH: CPT

## 2024-08-25 PROCEDURE — 94640 AIRWAY INHALATION TREATMENT: CPT

## 2024-08-25 PROCEDURE — 6360000002 HC RX W HCPCS: Performed by: INTERNAL MEDICINE

## 2024-08-25 PROCEDURE — 92526 ORAL FUNCTION THERAPY: CPT

## 2024-08-25 PROCEDURE — 6360000002 HC RX W HCPCS: Performed by: NURSE PRACTITIONER

## 2024-08-25 PROCEDURE — 83735 ASSAY OF MAGNESIUM: CPT

## 2024-08-25 PROCEDURE — 2700000000 HC OXYGEN THERAPY PER DAY

## 2024-08-25 PROCEDURE — 2060000000 HC ICU INTERMEDIATE R&B

## 2024-08-25 PROCEDURE — 99233 SBSQ HOSP IP/OBS HIGH 50: CPT

## 2024-08-25 PROCEDURE — 2580000003 HC RX 258: Performed by: NURSE PRACTITIONER

## 2024-08-25 PROCEDURE — 36415 COLL VENOUS BLD VENIPUNCTURE: CPT

## 2024-08-25 PROCEDURE — 2580000003 HC RX 258: Performed by: INTERNAL MEDICINE

## 2024-08-25 PROCEDURE — 6370000000 HC RX 637 (ALT 250 FOR IP)

## 2024-08-25 PROCEDURE — 80076 HEPATIC FUNCTION PANEL: CPT

## 2024-08-25 PROCEDURE — 83880 ASSAY OF NATRIURETIC PEPTIDE: CPT

## 2024-08-25 PROCEDURE — 85025 COMPLETE CBC W/AUTO DIFF WBC: CPT

## 2024-08-25 PROCEDURE — 2500000003 HC RX 250 WO HCPCS: Performed by: INTERNAL MEDICINE

## 2024-08-25 PROCEDURE — 94761 N-INVAS EAR/PLS OXIMETRY MLT: CPT

## 2024-08-25 PROCEDURE — 6370000000 HC RX 637 (ALT 250 FOR IP): Performed by: INTERNAL MEDICINE

## 2024-08-25 PROCEDURE — 80048 BASIC METABOLIC PNL TOTAL CA: CPT

## 2024-08-25 RX ORDER — METOPROLOL TARTRATE 25 MG/1
25 TABLET, FILM COATED ORAL 2 TIMES DAILY
Status: DISCONTINUED | OUTPATIENT
Start: 2024-08-25 | End: 2024-08-26

## 2024-08-25 RX ORDER — AMIODARONE HYDROCHLORIDE 200 MG/1
400 TABLET ORAL 3 TIMES DAILY
Status: DISCONTINUED | OUTPATIENT
Start: 2024-08-25 | End: 2024-08-26

## 2024-08-25 RX ADMIN — IPRATROPIUM BROMIDE AND ALBUTEROL SULFATE 1 DOSE: 2.5; .5 SOLUTION RESPIRATORY (INHALATION) at 11:42

## 2024-08-25 RX ADMIN — DOXYCYCLINE HYCLATE 100 MG: 100 TABLET, COATED ORAL at 08:48

## 2024-08-25 RX ADMIN — IPRATROPIUM BROMIDE AND ALBUTEROL SULFATE 1 DOSE: 2.5; .5 SOLUTION RESPIRATORY (INHALATION) at 15:15

## 2024-08-25 RX ADMIN — AMIODARONE HYDROCHLORIDE 0.5 MG/MIN: 50 INJECTION, SOLUTION INTRAVENOUS at 04:00

## 2024-08-25 RX ADMIN — ENOXAPARIN SODIUM 120 MG: 150 INJECTION SUBCUTANEOUS at 08:49

## 2024-08-25 RX ADMIN — IPRATROPIUM BROMIDE AND ALBUTEROL SULFATE 1 DOSE: 2.5; .5 SOLUTION RESPIRATORY (INHALATION) at 08:00

## 2024-08-25 RX ADMIN — GUAIFENESIN 600 MG: 600 TABLET ORAL at 08:48

## 2024-08-25 RX ADMIN — CEFTRIAXONE SODIUM 1000 MG: 1 INJECTION, POWDER, FOR SOLUTION INTRAMUSCULAR; INTRAVENOUS at 08:58

## 2024-08-25 RX ADMIN — IPRATROPIUM BROMIDE AND ALBUTEROL SULFATE 1 DOSE: 2.5; .5 SOLUTION RESPIRATORY (INHALATION) at 19:33

## 2024-08-25 RX ADMIN — POLYETHYLENE GLYCOL 3350 17 G: 17 POWDER, FOR SOLUTION ORAL at 08:49

## 2024-08-25 RX ADMIN — ENOXAPARIN SODIUM 120 MG: 150 INJECTION SUBCUTANEOUS at 19:55

## 2024-08-25 RX ADMIN — METOPROLOL TARTRATE 25 MG: 25 TABLET, FILM COATED ORAL at 08:49

## 2024-08-25 RX ADMIN — GUAIFENESIN 600 MG: 600 TABLET ORAL at 19:55

## 2024-08-25 RX ADMIN — SODIUM BICARBONATE: 84 INJECTION, SOLUTION INTRAVENOUS at 02:11

## 2024-08-25 RX ADMIN — DOXYCYCLINE HYCLATE 100 MG: 100 TABLET, COATED ORAL at 19:55

## 2024-08-25 RX ADMIN — AMIODARONE HYDROCHLORIDE 400 MG: 200 TABLET ORAL at 19:55

## 2024-08-25 RX ADMIN — METOPROLOL TARTRATE 25 MG: 25 TABLET, FILM COATED ORAL at 19:55

## 2024-08-25 RX ADMIN — AMIODARONE HYDROCHLORIDE 400 MG: 200 TABLET ORAL at 08:48

## 2024-08-25 RX ADMIN — AMIODARONE HYDROCHLORIDE 400 MG: 200 TABLET ORAL at 15:32

## 2024-08-25 RX ADMIN — SODIUM CHLORIDE, PRESERVATIVE FREE 10 ML: 5 INJECTION INTRAVENOUS at 19:58

## 2024-08-25 ASSESSMENT — PAIN SCALES - GENERAL
PAINLEVEL_OUTOF10: 0

## 2024-08-25 NOTE — PROGRESS NOTES
Department of Internal Medicine  Nephrology Progress Note        SUBJECTIVE:    We are following this patient for TRI.  The patient was seen and examined; he feels well today with no CP, SOB, nausea or vomiting.    ROS: No fever or chills.  Social: Family at bedside.    Physical Exam:    VITALS:  /63   Pulse (!) 110   Temp 97.9 °F (36.6 °C) (Oral)   Resp 20   Ht 1.93 m (6' 3.98\")   Wt 122.9 kg (271 lb)   SpO2 95%   BMI 33.00 kg/m²     General appearance: Seems comfortable, no acute distress.  Neck: Trachea midline, thyroid normal.   Lungs:  Non labored breathing, CTA to anterior auscultation.  Heart:  S1S2 normal, rub or gallop. Trace peripheral edema.  Abdomen: Soft, non-tender, no organomegaly.   Skin: No lesions or rashes, warm to touch.     DATA:    CBC with Differential:    Lab Results   Component Value Date/Time    WBC 16.3 08/25/2024 06:11 AM    RBC 3.70 08/25/2024 06:11 AM    HGB 10.0 08/25/2024 06:11 AM    HCT 30.7 08/25/2024 06:11 AM     08/25/2024 06:11 AM    MCV 82.8 08/25/2024 06:11 AM    MCH 27.1 08/25/2024 06:11 AM    MCHC 32.7 08/25/2024 06:11 AM    RDW 14.5 08/25/2024 06:11 AM    LYMPHOPCT 6.5 08/25/2024 06:11 AM    MONOPCT 7.3 08/25/2024 06:11 AM    EOSPCT 0.0 08/25/2024 06:11 AM    BASOPCT 0.1 08/25/2024 06:11 AM    MONOSABS 1.2 08/25/2024 06:11 AM    LYMPHSABS 1.1 08/25/2024 06:11 AM    EOSABS 0.0 08/25/2024 06:11 AM    BASOSABS 0.0 08/25/2024 06:11 AM     BMP:    Lab Results   Component Value Date/Time     08/25/2024 06:11 AM    K 3.8 08/25/2024 06:11 AM    K 3.8 08/24/2024 05:17 AM     08/25/2024 06:11 AM    CO2 20 08/25/2024 06:11 AM    BUN 70 08/25/2024 06:11 AM    CREATININE 1.3 08/25/2024 06:11 AM    CALCIUM 7.9 08/25/2024 06:11 AM    LABGLOM 55 08/25/2024 06:11 AM    GLUCOSE 127 08/25/2024 06:11 AM       IMPRESSION/RECOMMENDATIONS:      1- TRI: Likely secondary to a pre-renal component, although a non oliguric ATN injury cannot be ruled out. His urinary

## 2024-08-25 NOTE — PROGRESS NOTES
Hospital Medicine Progress Note      Date of Admission: 8/22/2024  Hospital Day: 4    Chief Admission Complaint:  Shortness of breath, Atrial fibrillation     Subjective:  Patient seen and examined this morning. He states that he feels great. He wants to go home.     Presenting Admission History:       83 y.o. male who presented to the emergency room with a chief complaint of shortness of breath and feeling generalized fatigue and weakness.       Upon arrival to the emergency room patient was noted to have elevated heart rate ranging from 130s to 170s  Twelve-lead EKG reveals A-fib with RVR patient workup was started with IV diltiazem bolus of 10 mg with little effect on heart rate patient received another 20 mg followed by diltiazem drip  Patient's heart rate gradually improved in the range of 130s     Currently on no home medications available.  The workup in the emergency room notable for:     Upon arrival to the emergency room patient's saturation was 86% on room air.  Patient placed on high flow and saturation increased to 93% at 6 L      Assessment/Plan:      Current Principal Problem:  Paroxysmal A-fib (HCC)    Atrial Fibrillation with RVR   Elevated BNP   Elevated Troponin   - Elevated BNP and trop likely secondary to a. Fib   - Echo revealed an EF of 25-30%  - Full dose Lovenox   - Several IV pushes of metoprolol   - Cardiology consulted: increase amiodarone 400 mg daily, start metoprolol 25 mg BID, discontinue amiodarone gtt today      Acute Hypoxic Respiratory Failure   - Secondary to PNA and volume overload   - IV lasix x1   - Continue Abx   - Wean O2 as able     Community Acquired Pneumonia   - CXR shows potential PNA   - Elevated WBC continues to down trend   - Continue ceftriaxone and doxycycline     Acute Kidney Injury   Elevated BUN   - Cr improved   - Secondary to atrial fibrillation vs volume overload  - Gustafson inserted for retention and accurate I&Os   - Avoid nephrotoxic medications  - Continue

## 2024-08-25 NOTE — PLAN OF CARE
Problem: Discharge Planning  Goal: Discharge to home or other facility with appropriate resources  8/25/2024 0927 by Giovanni Fang RN  Outcome: Progressing  8/25/2024 0158 by Julio Cavazos RN  Outcome: Progressing  Flowsheets  Taken 8/25/2024 0000 by Julio Cavaozs RN  Discharge to home or other facility with appropriate resources: Identify barriers to discharge with patient and caregiver  Taken 8/24/2024 0815 by Giovanni Fang RN  Discharge to home or other facility with appropriate resources:   Identify barriers to discharge with patient and caregiver   Arrange for needed discharge resources and transportation as appropriate     Problem: Pain  Goal: Verbalizes/displays adequate comfort level or baseline comfort level  8/25/2024 0927 by Giovanni Fang RN  Outcome: Progressing  Flowsheets (Taken 8/25/2024 0715)  Verbalizes/displays adequate comfort level or baseline comfort level:   Encourage patient to monitor pain and request assistance   Assess pain using appropriate pain scale  8/25/2024 0158 by Julio Cavazos RN  Outcome: Progressing  Flowsheets  Taken 8/25/2024 0000 by Julio Cavazos RN  Verbalizes/displays adequate comfort level or baseline comfort level:   Encourage patient to monitor pain and request assistance   Assess pain using appropriate pain scale   Administer analgesics based on type and severity of pain and evaluate response   Implement non-pharmacological measures as appropriate and evaluate response   Consider cultural and social influences on pain and pain management   Notify Licensed Independent Practitioner if interventions unsuccessful or patient reports new pain  Taken 8/24/2024 1645 by Giovanni Fang RN  Verbalizes/displays adequate comfort level or baseline comfort level:   Encourage patient to monitor pain and request assistance   Assess pain using appropriate pain scale     Problem: Skin/Tissue Integrity  Goal: Absence of new skin breakdown  Description: 1.

## 2024-08-25 NOTE — PROGRESS NOTES
Speech Language Pathology  Dysphagia Treatment/Follow-Up Note  Facility/Department: [place] [address] [facility]    Recommendations:  Solid Consistency: IDDSI 7 Regular Solids  Liquid Consistency: IDDSI 0 Thin Liquids  Medication: Meds PO as tolerated    Risk Management: upright for all intake, stay upright for at least 30 mins after intake, small bites/sips, alternate bites/sips, slow rate of intake, general GERD precautions, STRICT aspiration precautions, and hold PO and contact SLP if s/s of aspiration or worsening respiratory status develop..   *Pt may benefit from MBSS to correlate clinical findings, cont to monitor. Pt w no clinical s/s of aspiration at bedside this date--pt also not agreeable in past.     NAME:Low Brooks  : 1940 (83 y.o.)   MRN: [id number]  ROOM: [facility]9/0449-01  ADMISSION DATE: 2024  PATIENT DIAGNOSIS(ES): Paroxysmal A-fib (HCC) [I48.0]  Not on File    DATE ONSET: 2024    Pain: The patient does not complain of pain       Current Diet: ADULT DIET; Regular; No Added Salt (3-4 gm)      Diet Tolerance:  Patient tolerating current diet level without signs/symptoms of aspiration.     Dysphagia Treatment and Impressions:  Subjective: Pt seen in room at bedside with RN permission (Giovanni)   Behavior / Cognition: pt alert and agreeable to tx.  RN Report/Chart Review: RN reports some intermittent throat clearing, no major coughing/choking episodes.  Patient tolerance: Pt and pt's wife (present at bedside) endorse tolerance of diet. Deny coughing/choking with PO.    Baseline Respiratory Status Measures: Pt with SPO2% of 95 on 5 LPM NC with RR of 20/min    Liquid PO Trials:    IDDSI 0 Thin:  Assessed via straw: no anterior bolus loss , suspect functional A-P bolus transit, swallow timing subjectively appears timely, no clinical s/s of aspiration, and vitals stable    Solid PO Trials  IDDSI 7 Regular:   no anterior bolus loss , suspect functional A-P bolus transit, swallow timing subjectively  STRICT aspiration precautions, and hold PO and contact SLP if s/s of aspiration or worsening respiratory status develop..   *Pt may benefit from MBSS to correlate clinical findings, cont to monitor. Pt w no clinical s/s of aspiration at bedside this date--pt also not agreeable in past.     Education: SLP edu pt re: Role of SLP, Rationale for dysphagia tx, Aspiration precautions, and techniques to improve respiratory-swallow coordination. Pt verbalized understanding and would benefit from ongoing education           Plan:    Continued Dysphagia treatment with goals per plan of care.    Discharge Recommendations: SLP at discharge is pending clinical progression    If pt discharges from hospital prior to Speech/Swallowing discharge, this note serves as tx and discharge summary.     Total Treatment Time / Charges     Time in Time out Total Time / units   Cognitive Tx         Speech Tx      Dysphagia Tx 1430 1440 10 min / 1 unit     Signature:  Aurelia Smith MA, CCC-SLP  Speech Pathologist  [id number]

## 2024-08-25 NOTE — PROGRESS NOTES
[facility]     Electrophysiology                                     Progress Note    Admission date:  2024    Reason for follow up visit: Atrial fibrillation/Acute systolic heart failure    HPI/CC: Low Brooks was admitted on 2024 with generalized weakness and shortness of breath.  Patient was found to be in rapid atrial fibrillation and possible pneumonia patient was started on IV amiodarone.  Echo revealed LVEF 25-30% of note patient had mechanical fall approximately 6 weeks ago and his wife says he has \"gone downhill\" since that time.  He was seen at PCP however wife states ECG was not performed.  Wife states patient has not been very mobile at home likely contributing to the pneumonia.  Patient also appears to be in acute systolic heart failure.  proBNP over 15,000.  WBC 18.2.  Procalcitonin mildly elevated at 0.39.  Lactic acid 1.9 which gradually increased to 2.1 (normal lactic acid is 0.4-2.0).  BUN 54 creatinine 1.4, troponin 43 and 44.   BUN 81 creatinine 1.4.  Nephrology consulted and started IV fluids   BUN trending down.  Creatinine has normalized.    Rhythm has been rapid atrial fibrillation rate 110s    Subjective: Patient sitting in bed.  Patient appears extremely short of breath.  He is on O2 at 6 L per nasal cannula.  Respiratory rate in the 30s.  Patient continues to deny chest pain, shortness of breath and palpitations.  States \"I just want to go home\" he is unaware if he has any abdominal distention or lower extremity edema.  He continuously repeats he just wants to go home.  His weight is 271 pounds today which is up 4 pounds from yesterday he is net +1062 mL    Vitals:  Blood pressure 130/63, pulse (!) 110, temperature 97.9 °F (36.6 °C), temperature source Oral, resp. rate 20, height 1.93 m (6' 3.98\"), weight 122.9 kg (271 lb), SpO2 95%.  Temp  Av.9 °F (36.6 °C)  Min: 97.1 °F (36.2 °C)  Max: 98.2 °F (36.8 °C)  Pulse  Av  Min: 91  Max: 119  BP   1.3--baseline creatinine~1.4--Labs in Care Everywhere  Elevated BUN likely heart failure related  Hypertension  Hyperlipidemia  Moderate aortic regurgitation  CAP-antibiotics per primary team  Transaminitis--likely hepatic congestion--liver enzymes normal 7/5/2020-in Care Everywhere  Pneumonia--RLL  Leukocytosis 16.3-trending down    Plan:   Continue amiodarone drip at 0.5 mg/min--discontinue at 1500 today  Continue amiodarone 400 mg twice daily  Start metoprolol tartrate 25 mg twice daily--for better rate control-however allow for some tachycardia due to infection  Antibiotics per primary team for RLL pneumonia  Transaminitis -likely hepatic congestion from HF--likely needs IV diuresis  Continue to monitor on telemetry  Daily labs and replace electrolytes as needed  Daily weights and strict I's and O's  Low-sodium diet  Discussed with patient and nursing    Time spent 50 minutes on plan and care      Kim RANGEL-[facility]  [phone]

## 2024-08-26 ENCOUNTER — APPOINTMENT (OUTPATIENT)
Dept: GENERAL RADIOLOGY | Age: 84
DRG: 193 | End: 2024-08-26
Payer: MEDICARE

## 2024-08-26 PROBLEM — I48.91 ATRIAL FIBRILLATION WITH RAPID VENTRICULAR RESPONSE (HCC): Status: ACTIVE | Noted: 2024-08-26

## 2024-08-26 PROBLEM — I42.0 DILATED CARDIOMYOPATHY (HCC): Status: ACTIVE | Noted: 2024-08-26

## 2024-08-26 PROBLEM — R06.02 SHORTNESS OF BREATH: Status: ACTIVE | Noted: 2024-08-26

## 2024-08-26 LAB
ANION GAP SERPL CALCULATED.3IONS-SCNC: 12 MMOL/L (ref 3–16)
ANISOCYTOSIS BLD QL SMEAR: ABNORMAL
BACTERIA BLD CULT ORG #2: NORMAL
BACTERIA BLD CULT: NORMAL
BASE EXCESS BLDV CALC-SCNC: -7.5 MMOL/L (ref -3–3)
BASOPHILS # BLD: 0 K/UL (ref 0–0.2)
BASOPHILS NFR BLD: 0 %
BUN SERPL-MCNC: 84 MG/DL (ref 7–20)
BURR CELLS BLD QL SMEAR: ABNORMAL
CALCIUM SERPL-MCNC: 7.9 MG/DL (ref 8.3–10.6)
CHLORIDE SERPL-SCNC: 109 MMOL/L (ref 99–110)
CO2 BLDV-SCNC: 15 MMOL/L
CO2 SERPL-SCNC: 19 MMOL/L (ref 21–32)
COHGB MFR BLDV: 3.2 % (ref 0–1.5)
CREAT SERPL-MCNC: 1.3 MG/DL (ref 0.8–1.3)
DEPRECATED RDW RBC AUTO: 15 % (ref 12.4–15.4)
EOSINOPHIL # BLD: 0 K/UL (ref 0–0.6)
EOSINOPHIL NFR BLD: 0 %
GFR SERPLBLD CREATININE-BSD FMLA CKD-EPI: 55 ML/MIN/{1.73_M2}
GLUCOSE SERPL-MCNC: 123 MG/DL (ref 70–99)
HCO3 BLDV-SCNC: 14.7 MMOL/L (ref 23–29)
HCT VFR BLD AUTO: 27.3 % (ref 40.5–52.5)
HGB BLD-MCNC: 8.9 G/DL (ref 13.5–17.5)
LYMPHOCYTES # BLD: 2 K/UL (ref 1–5.1)
LYMPHOCYTES NFR BLD: 11 %
MACROCYTES BLD QL SMEAR: ABNORMAL
MCH RBC QN AUTO: 26.9 PG (ref 26–34)
MCHC RBC AUTO-ENTMCNC: 32.5 G/DL (ref 31–36)
MCV RBC AUTO: 83 FL (ref 80–100)
METAMYELOCYTES NFR BLD MANUAL: 1 %
METHGB MFR BLDV: 0.1 %
MICROCYTES BLD QL SMEAR: ABNORMAL
MONOCYTES # BLD: 0.2 K/UL (ref 0–1.3)
MONOCYTES NFR BLD: 1 %
NEUTROPHILS # BLD: 16.2 K/UL (ref 1.7–7.7)
NEUTROPHILS NFR BLD: 87 %
NRBC BLD-RTO: 2 /100 WBC
O2 THERAPY: ABNORMAL
OVALOCYTES BLD QL SMEAR: ABNORMAL
PCO2 BLDV: 20.9 MMHG (ref 40–50)
PH BLDV: 7.47 [PH] (ref 7.35–7.45)
PLATELET # BLD AUTO: 516 K/UL (ref 135–450)
PLATELET BLD QL SMEAR: ABNORMAL
PMV BLD AUTO: 7.5 FL (ref 5–10.5)
PO2 BLDV: 67.7 MMHG (ref 25–40)
POIKILOCYTOSIS BLD QL SMEAR: ABNORMAL
POLYCHROMASIA BLD QL SMEAR: ABNORMAL
POTASSIUM SERPL-SCNC: 4 MMOL/L (ref 3.5–5.1)
RBC # BLD AUTO: 3.29 M/UL (ref 4.2–5.9)
SAO2 % BLDV: 94 %
SCHISTOCYTES BLD QL SMEAR: ABNORMAL
SLIDE REVIEW: ABNORMAL
SODIUM SERPL-SCNC: 140 MMOL/L (ref 136–145)
WBC # BLD AUTO: 18.4 K/UL (ref 4–11)

## 2024-08-26 PROCEDURE — 97535 SELF CARE MNGMENT TRAINING: CPT

## 2024-08-26 PROCEDURE — 6370000000 HC RX 637 (ALT 250 FOR IP): Performed by: NURSE PRACTITIONER

## 2024-08-26 PROCEDURE — 6360000002 HC RX W HCPCS: Performed by: INTERNAL MEDICINE

## 2024-08-26 PROCEDURE — 97530 THERAPEUTIC ACTIVITIES: CPT

## 2024-08-26 PROCEDURE — 85025 COMPLETE CBC W/AUTO DIFF WBC: CPT

## 2024-08-26 PROCEDURE — 2580000003 HC RX 258: Performed by: INTERNAL MEDICINE

## 2024-08-26 PROCEDURE — 94640 AIRWAY INHALATION TREATMENT: CPT

## 2024-08-26 PROCEDURE — 36415 COLL VENOUS BLD VENIPUNCTURE: CPT

## 2024-08-26 PROCEDURE — 99232 SBSQ HOSP IP/OBS MODERATE 35: CPT | Performed by: NURSE PRACTITIONER

## 2024-08-26 PROCEDURE — 94761 N-INVAS EAR/PLS OXIMETRY MLT: CPT

## 2024-08-26 PROCEDURE — 2700000000 HC OXYGEN THERAPY PER DAY

## 2024-08-26 PROCEDURE — 51702 INSERT TEMP BLADDER CATH: CPT

## 2024-08-26 PROCEDURE — 6370000000 HC RX 637 (ALT 250 FOR IP): Performed by: INTERNAL MEDICINE

## 2024-08-26 PROCEDURE — 2060000000 HC ICU INTERMEDIATE R&B

## 2024-08-26 PROCEDURE — 71045 X-RAY EXAM CHEST 1 VIEW: CPT

## 2024-08-26 PROCEDURE — 6370000000 HC RX 637 (ALT 250 FOR IP)

## 2024-08-26 PROCEDURE — 6360000002 HC RX W HCPCS: Performed by: NURSE PRACTITIONER

## 2024-08-26 PROCEDURE — 80048 BASIC METABOLIC PNL TOTAL CA: CPT

## 2024-08-26 PROCEDURE — 82803 BLOOD GASES ANY COMBINATION: CPT

## 2024-08-26 RX ORDER — AMIODARONE HYDROCHLORIDE 200 MG/1
400 TABLET ORAL 2 TIMES DAILY
Status: DISCONTINUED | OUTPATIENT
Start: 2024-08-26 | End: 2024-08-28 | Stop reason: HOSPADM

## 2024-08-26 RX ORDER — METOPROLOL SUCCINATE 25 MG/1
25 TABLET, EXTENDED RELEASE ORAL 2 TIMES DAILY
Status: DISCONTINUED | OUTPATIENT
Start: 2024-08-26 | End: 2024-08-28 | Stop reason: HOSPADM

## 2024-08-26 RX ORDER — FUROSEMIDE 10 MG/ML
40 INJECTION INTRAMUSCULAR; INTRAVENOUS ONCE
Status: COMPLETED | OUTPATIENT
Start: 2024-08-26 | End: 2024-08-26

## 2024-08-26 RX ORDER — FUROSEMIDE 10 MG/ML
20 INJECTION INTRAMUSCULAR; INTRAVENOUS 2 TIMES DAILY
Status: DISCONTINUED | OUTPATIENT
Start: 2024-08-26 | End: 2024-08-27

## 2024-08-26 RX ADMIN — IPRATROPIUM BROMIDE AND ALBUTEROL SULFATE 1 DOSE: 2.5; .5 SOLUTION RESPIRATORY (INHALATION) at 15:57

## 2024-08-26 RX ADMIN — SODIUM CHLORIDE, PRESERVATIVE FREE 10 ML: 5 INJECTION INTRAVENOUS at 08:33

## 2024-08-26 RX ADMIN — FUROSEMIDE 20 MG: 10 INJECTION, SOLUTION INTRAMUSCULAR; INTRAVENOUS at 17:08

## 2024-08-26 RX ADMIN — IPRATROPIUM BROMIDE AND ALBUTEROL SULFATE 1 DOSE: 2.5; .5 SOLUTION RESPIRATORY (INHALATION) at 11:49

## 2024-08-26 RX ADMIN — ENOXAPARIN SODIUM 120 MG: 150 INJECTION SUBCUTANEOUS at 20:50

## 2024-08-26 RX ADMIN — METOPROLOL TARTRATE 25 MG: 25 TABLET, FILM COATED ORAL at 08:32

## 2024-08-26 RX ADMIN — AMIODARONE HYDROCHLORIDE 400 MG: 200 TABLET ORAL at 20:50

## 2024-08-26 RX ADMIN — DOXYCYCLINE HYCLATE 100 MG: 100 TABLET, COATED ORAL at 20:50

## 2024-08-26 RX ADMIN — GUAIFENESIN 600 MG: 600 TABLET ORAL at 20:50

## 2024-08-26 RX ADMIN — ENOXAPARIN SODIUM 120 MG: 150 INJECTION SUBCUTANEOUS at 08:32

## 2024-08-26 RX ADMIN — FUROSEMIDE 20 MG: 10 INJECTION, SOLUTION INTRAMUSCULAR; INTRAVENOUS at 11:33

## 2024-08-26 RX ADMIN — IPRATROPIUM BROMIDE AND ALBUTEROL SULFATE 1 DOSE: 2.5; .5 SOLUTION RESPIRATORY (INHALATION) at 19:47

## 2024-08-26 RX ADMIN — IPRATROPIUM BROMIDE AND ALBUTEROL SULFATE 1 DOSE: 2.5; .5 SOLUTION RESPIRATORY (INHALATION) at 08:09

## 2024-08-26 RX ADMIN — CEFTRIAXONE SODIUM 1000 MG: 1 INJECTION, POWDER, FOR SOLUTION INTRAMUSCULAR; INTRAVENOUS at 08:39

## 2024-08-26 RX ADMIN — GUAIFENESIN 600 MG: 600 TABLET ORAL at 08:32

## 2024-08-26 RX ADMIN — METOPROLOL SUCCINATE 25 MG: 25 TABLET, EXTENDED RELEASE ORAL at 20:50

## 2024-08-26 RX ADMIN — DOXYCYCLINE HYCLATE 100 MG: 100 TABLET, COATED ORAL at 08:32

## 2024-08-26 RX ADMIN — AMIODARONE HYDROCHLORIDE 400 MG: 200 TABLET ORAL at 08:32

## 2024-08-26 RX ADMIN — FUROSEMIDE 40 MG: 10 INJECTION, SOLUTION INTRAMUSCULAR; INTRAVENOUS at 22:22

## 2024-08-26 ASSESSMENT — PAIN SCALES - GENERAL
PAINLEVEL_OUTOF10: 0
PAINLEVEL_OUTOF10: 0

## 2024-08-26 NOTE — PROGRESS NOTES
08/26/24 1559   Oxygen Therapy/Pulse Ox   O2 Therapy Oxygen   O2 Device High flow nasal cannula   O2 Flow Rate (L/min) 4 L/min   Pulse 91   SpO2 (!) 89 %

## 2024-08-26 NOTE — PROGRESS NOTES
Lab called with critical BUN of 84. Patient does not have fluids running as they were stopped yesterday per nephrology. Noted that he does have a concern for acute systolic HF. Elsi Tinoco MD notified via perfect serve.

## 2024-08-26 NOTE — PROGRESS NOTES
Hospital Medicine Progress Note      Date of Admission: 8/22/2024  Hospital Day: 5      Chief Admission Complaint:  Shortness of breath, Atrial fibrillation      Subjective:  Patient seen and examined this morning. He states that he feels well, family/wife at bedside. He wants to go home. On 4L o2, denies sob, pt is on room air at home, worked with pt/ot and noted to have inc'd wob     Presenting Admission History:        83 y.o. male who presented to the emergency room with a chief complaint of shortness of breath and feeling generalized fatigue and weakness.       Upon arrival to the emergency room patient was noted to have elevated heart rate ranging from 130s to 170s  Twelve-lead EKG reveals A-fib with RVR patient workup was started with IV diltiazem bolus of 10 mg with little effect on heart rate patient received another 20 mg followed by diltiazem drip  Patient's heart rate gradually improved in the range of 130s     Currently on no home medications available.  The workup in the emergency room notable for:     Upon arrival to the emergency room patient's saturation was 86% on room air.  Patient placed on high flow and saturation increased to 93% at 6 L        Assessment/Plan:       Current Principal Problem:  Paroxysmal A-fib (HCC)     Atrial Fibrillation with RVR   Elevated BNP   Elevated Troponin   - Elevated BNP and trop likely secondary to a. Fib   - Echo revealed an EF of 25-30%  - Full dose Lovenox   - Several IV pushes of metoprolol   - Cardiology consulted: increase amiodarone 400 mg daily, start metoprolol 25 mg BID, discontinue amiodarone gtt       Acute Hypoxic Respiratory Failure   - Secondary to PNA and volume overload   - IV lasix given  - Continued Abx   - Wean O2 as able      Community Acquired Pneumonia   - CXR shows potential PNA   - Elevated WBC continues to down trend   - Continue ceftriaxone and doxycycline      Acute Kidney Injury   Elevated BUN   - Cr improved   - Secondary to atrial      ------------------------------------------------------------------------------------------------------------------------------------------------------------------------    MDM      [x] High (any 2)    A. Problems (any 1)  [x] Acute/Chronic Illness/injury posing threat to life or bodily function:    [] Severe exacerbation of chronic illness:    ---------------------------------------------------------------------  B. Risk of Treatment (any 1)   [] Drugs/treatments that require intensive monitoring for toxicity include:    [] IV ABX requiring serial renal monitoring for nephrotoxicity:     [] IV Narcotic analgesia for adverse drug reaction  [] IV diuresis requiring serial monitoring for renal impairment and electrolyte derangements  [] Critical electrolyte abnormalities requiring IV replacement and close serial monitoring  [] Insulin - monitoring serial FSBS for Hypoglycemic adverse drug reaction  [] Anticoagulation requiring serial monitoring of coagulation factors  [] Other -   [] Change in code status:    [] Decision to escalate care:    [] Major surgery/procedure with associated risk factors:    ----------------------------------------------------------------------  C. Data (any 2)  [x] Discussed current management and discharge planning options with Case Management.  [] Discussed management of the case with:    [] Telemetry personally reviewed and interpreted as documented above    [] Imaging personally reviewed and interpreted, includes:    [x] Data Review (any 3)  [x] All available Consultant notes from yesterday/today were reviewed  [x] All current labs were reviewed and interpreted for clinical significance   [x] Appropriate follow-up labs were ordered  [] Collateral history obtained from:        Medications:  Personally reviewed in detail in conjunction w/ labs as documented for evidence of drug toxicity.     Infusion Medications    sodium chloride       Scheduled Medications    furosemide  20 mg

## 2024-08-26 NOTE — PROGRESS NOTES
Occupational Therapy  Facility/Department: [facility]  Daily Treatment Note  NAME: Low Brooks  : 1940  MRN: [id number]    Date of Service: 2024    Discharge Recommendations:  Subacute/Skilled Nursing Facility  OT Equipment Recommendations  Equipment Needed: No  Therapy discharge recommendations are subject to collaboration from the patient’s interdisciplinary healthcare team, including MD and case management recommendations.    Barriers to Home Discharge:   [] Steps to access home entry or bed/bath:   [x] Unable to transfer, ambulate, or propel wheelchair household distances without assist   [x] Limited available assist at home upon discharge    [x] Patient or family requests d/c to post-acute facility    [x] Poor cognition/safety awareness for d/c to home alone    [] Unable to maintain ordered weight bearing status    [x] Patient with salient signs of long-standing immobility   [x] Decreased independence with ADLs   [x] Increased risk for falls   [] Other:    If pt is unable to be seen after this session, please let this note serve as discharge summary.  Please see case management note for discharge disposition.  Thank you.      Patient Diagnosis(es): The primary encounter diagnosis was Pneumonia of right lower lobe due to infectious organism. Diagnoses of Septicemia (HCC), Atrial fibrillation with rapid ventricular response (HCC), Atrial fibrillation, unspecified type (HCC), and Shortness of breath were also pertinent to this visit.     Assessment   Assessment: Pt limited by fatigue and endurance during session. Pt required max A x2 for bed mobility, mod Ax2 for STS from EOB. Co-tx collaboration this date to safely meet goals and will have better occupational performance outcomes with in a co-treatment than 1:1 session. Pt with heavy breathing during session, demo'd poor understanding of pursed lip breathing during session, SPO2 88% after attempt to stand, recovered to 90% after inc time. Pt

## 2024-08-26 NOTE — PROGRESS NOTES
Physical Therapy  Facility/Department: [facility]  Daily Treatment Note  NAME: Low Brooks  : 1940  MRN: [id number]    Date of Service: 2024    Discharge Recommendations:  Subacute/Skilled Nursing Facility   PT Equipment Recommendations  Equipment Needed: No  Other: Defer    Therapy discharge recommendations take into account each patient's current medical complexities and are subject to input/oversight from the patient's healthcare team.   Barriers to Home Discharge:   [x] Steps to access home entry or bed/bath:   [x] Unable to transfer, ambulate, or propel wheelchair household distances without assist   [x] Limited available assist at home upon discharge    [x] Patient or family (spouse) requests d/c to post-acute facility    [] Poor cognition/safety awareness for d/c to home alone    []Unable to maintain ordered weight bearing status    [] Patient with salient signs of long-standing immobility   [x] Patient is at risk for falls   [] Other:    If pt is unable to be seen after this session, please let this note serve as discharge summary.  Please see case management note for discharge disposition.  Thank you.      Patient Diagnosis(es): The primary encounter diagnosis was Pneumonia of right lower lobe due to infectious organism. Diagnoses of Septicemia (HCC), Atrial fibrillation with rapid ventricular response (HCC), Atrial fibrillation, unspecified type (HCC), and Shortness of breath were also pertinent to this visit.    Assessment  Assessment: Pt limited in progressions this date due to weakness and SOB with activity, SpO2 remains above 90% during session, however pt begins to hyperventilate through mouth and difficulty with PLB despite frequent cues while sitting EOB and attempting standing. Pt required increased assistance up to max Ax2 for bed mobility and mod Ax2 for two STS attempts to SW. Pt had small BM and assisted with miguelangel care in standing, unable to tolerate transfer to the chair  time  Interventions: Verbal cues;Visual cues;Safety awareness training;Weight shifting training/pressure relief  Rolling: Moderate assistance;Assist X2  Supine to Sit: Maximum assistance;Assist X2  Sit to Supine: Maximum assistance;Assist X2  Scooting: Maximum assistance;Assist X2  Balance  Sitting: With support  Standing: Impaired  Standing - Static: Constant support;Poor  Standing - Dynamic:  (NOEL)  Transfer Training  Transfer Training: Yes  Overall Level of Assistance: Moderate assistance;Assist X2  Interventions: Safety awareness training;Verbal cues;Visual cues (hand placement cues)  Sit to Stand: Moderate assistance;Assist X2 (performed two reps)  Stand to Sit: Moderate assistance;Assist X2  Stand Pivot Transfers: Other (comment) (attempted, pt unable to complete)  Gait  Gait Training: No (NOEL due to poor tolerance with transfers)     PT Exercises  Exercise Treatment: BLE LAQ x5 (unable to tolerate further, more difficulty with RLE)     Safety Devices  Type of Devices: Nurse notified;Gait belt;All fall risk precautions in place;Call light within reach;Patient at risk for falls;Left in bed;Bed alarm in place;Heels elevated for pressure relief  Restraints  Restraints Initially in Place: No      Goals  Short Term Goals  Time Frame for Short Term Goals: 8/30/24 (7 days) unless otherwise stated  Short Term Goal 1: Pt will perform supine <> sit with supervision  Short Term Goal 2: Pt will perform all transfers with LRAD and min A  Short Term Goal 3: Pt will ambulate 20ft with LRAD and min A  Short Term Goal 4: Pt will perform 10 reps of BLE exercises by 8/26/24 - extend to 8/30  Patient Goals   Patient Goals : \"to go home\"    Education  Patient Education  Education Given To: Patient  Education Provided: Role of Therapy;Plan of Care;Transfer Training;Equipment;Family Education  Education Provided Comments: Disease Specific Education: Pt educated on importance of OOB mobility, prevention of complications of bedrest,

## 2024-08-26 NOTE — PLAN OF CARE
CHF Care Plan      Patient's EF (Ejection Fraction) is less than 40%    Heart Failure Medications:  Diuretics:: None    (One of the following REQUIRED for EF </= 40%/SYSTOLIC FAILURE but MAY be used in EF% >40%/DIASTOLIC FAILURE)        ACE:: None        ARB:: None         ARNI:: None    (Beta Blockers)  NON- Evidenced Based Beta Blocker (for EF% >40%/DIASTOLIC FAILURE): Metoprolol TARTrate- Lopressor    Evidenced Based Beta Blocker::(REQUIRED for EF% <40%/SYSTOLIC FAILURE) None  ...................................................................................................................................................    Failed to redirect to the Timeline version of the [employer] SmartLink.      Patient's weights and intake/output reviewed    Daily Weight log at bedside, patient/family participation in use of log: \"yes    Patient's current weight today shows a difference of 1 lbs less than last documented weight.      Intake/Output Summary (Last 24 hours) at 8/26/2024 0914  Last data filed at 8/26/2024 0839  Gross per 24 hour   Intake 2378.56 ml   Output 1650 ml   Net 728.56 ml       Education Booklet Provided: yes    Comorbidities Reviewed Yes    Patient has a past medical history of Hyperlipidemia and Hypertension.     >>For CHF and Comorbidity documentation on Education Time and Topics, please see Education Tab      Pt resting in bed at this time on  6 L O2. Pt with complaints of shortness of breath. Pt with pitting lower extremity edema.     Patient and/or Family's stated Goal of Care this Admission: reduce shortness of breath, increase activity tolerance, better understand heart failure and disease management, be more comfortable, and reduce lower extremity edema prior to discharge        :

## 2024-08-26 NOTE — PROGRESS NOTES
08/26/24 1601   Oxygen Therapy/Pulse Ox   O2 Therapy Oxygen   O2 Device High flow nasal cannula   O2 Flow Rate (L/min) 7 L/min   Pulse (!) 105   Respirations 22   SpO2 93 %

## 2024-08-26 NOTE — DISCHARGE INSTRUCTIONS
Heart Failure Resources:  Heart Failure Interactive Workbook:  Go to [email or web address] for a Free Heart Failure Interactive Workbook provided by The American [employer] Association. This interactive workbook will provide information on Healthier Living with Heart Failure. Please copy and paste link into search bar. Use your mouse to scroll through the pages.    HF [place] eliza:   Heart Failure Free smart phone eliza available for iPhone and Android download. Use your phone to track sodium intake, fluid intake, symptoms, and weight.     Low Sodium Diet / Recipes:  Go to [email or web address]el[employer] website for “renal” diet which is Low Sodium! [employer] is a dialysis company, but this website offers free seasonal cookbooks. Each quarter, they will release 25-30 new recipes with a breakdown of calories, sodium, and glucose. You can also go to [email or web address]/recipes website for free recipes.     Home Exercise Program:   Identification of Green/Yellow/Red zones:  You should be able to identify when you feel good (green zone), if you have 1-2 symptoms of HF (yellow zone), or if you are in need of medical attention (red zone).  In your CHF education folder you were provided a “stop light tool” to outline this information.     We want to you to rate your exertion levels:    Our therapy team has discussed means of identification with you such as the \"Vannesa scale.\"  The Vannesa rating scale ranges from 6 to 20, where 6 means \"no exertion at all\" and 20 means \"maximal exertion.\" The goal is to use this to gauge how much effort it is taking for you to do your normal daily tasks.   You should be able to recognize when too much exertion is being expended.    Elements of Energy Conservation:   Prioritize/Plan: Decide what needs to be done today, and what can wait for a later date, write to do lists, plan ahead to avoid extra trips, and gather supplies and equipment needed before starting an activity.   Position:

## 2024-08-26 NOTE — PROGRESS NOTES
08/26/24 1600   Oxygen Therapy/Pulse Ox   O2 Device High flow nasal cannula   O2 Flow Rate (L/min) 7 L/min   Pulse (!) 110   Respirations 22   SpO2 (!) 88 %

## 2024-08-26 NOTE — PROGRESS NOTES
Department of Internal Medicine  Nephrology Progress Note        SUBJECTIVE:    We are following this patient for TRI.  Patient appears short of breath; he just tried working with physical therapy prior to my rounds  He is on 5 L/min oxygen    ROS: No fever or chills.  Social: Family at bedside.    Physical Exam:    VITALS:  /73   Pulse (!) 117   Temp 97.3 °F (36.3 °C) (Oral)   Resp 24   Ht 1.93 m (6' 3.98\")   Wt 122.8 kg (270 lb 11.2 oz)   SpO2 95%   BMI 32.96 kg/m²     General appearance: Seems comfortable, no acute distress.  Neck: Trachea midline, thyroid normal.   Lungs: Decreased breath sounds at the bases.  Heart:  S1S2 normal, rub or gallop. Trace peripheral edema.  Abdomen: Soft, non-tender, no organomegaly.   Skin: No lesions or rashes, warm to touch.     DATA:    CBC with Differential:    Lab Results   Component Value Date/Time    WBC 18.4 08/26/2024 05:14 AM    RBC 3.29 08/26/2024 05:14 AM    HGB 8.9 08/26/2024 05:14 AM    HCT 27.3 08/26/2024 05:14 AM     08/26/2024 05:14 AM    MCV 83.0 08/26/2024 05:14 AM    MCH 26.9 08/26/2024 05:14 AM    MCHC 32.5 08/26/2024 05:14 AM    RDW 15.0 08/26/2024 05:14 AM    NRBC 2 08/26/2024 05:14 AM    METASPCT 1 08/26/2024 05:14 AM    LYMPHOPCT 11.0 08/26/2024 05:14 AM    MONOPCT 1.0 08/26/2024 05:14 AM    EOSPCT 0.0 08/26/2024 05:14 AM    BASOPCT 0.0 08/26/2024 05:14 AM    MONOSABS 0.2 08/26/2024 05:14 AM    LYMPHSABS 2.0 08/26/2024 05:14 AM    EOSABS 0.0 08/26/2024 05:14 AM    BASOSABS 0.0 08/26/2024 05:14 AM     BMP:    Lab Results   Component Value Date/Time     08/26/2024 05:14 AM    K 4.0 08/26/2024 05:14 AM    K 3.8 08/24/2024 05:17 AM     08/26/2024 05:14 AM    CO2 19 08/26/2024 05:14 AM    BUN 84 08/26/2024 05:14 AM    CREATININE 1.3 08/26/2024 05:14 AM    CALCIUM 7.9 08/26/2024 05:14 AM    LABGLOM 55 08/26/2024 05:14 AM    GLUCOSE 123 08/26/2024 05:14 AM     Echocardiogram    Image quality is adequate.    Left Ventricle: Severely  reduced left ventricular systolic function with a visually estimated EF of 25 - 30%. Left ventricle is mildly dilated. Mild septal thickening. Severe global hypokinesis present. E/e' ratio >11, suggesting increased LAP.    Right Ventricle: Right ventricle is mildly dilated. Reduced systolic function.    Left Atrium: Left atrium is severely dilated.    Right Atrium: Right atrium is severely dilated.    Aortic Valve: Trileaflet valve. Moderate regurgitation.    Mitral Valve: Mild regurgitation.    Tricuspid Valve: Mild regurgitation. RVSP may be underestimated in the setting of poor visualization of TR jet. The estimated RVSP is 23 mmHg.    Pericardium: There is evidence of epicardial fat. Trivial localized pericardial effusion present around the left ventricle. No indication of cardiac tamponade.    Conclusion: Findings are consistent with dilated cardiomyopathy.  No prior echocardiograms for comparison.    IMPRESSION/RECOMMENDATIONS:      - TRI: Likely secondary to a pre-renal component, improved with IV fluid    -Acute CHF, combined   Will start IV diuretics 20 mg twice daily     - HTN: Blood pressure within acceptable range.     - Anemia: Stable hemoglobin, monitor.     - Atrial fibrillation: Management per Cardiology.

## 2024-08-26 NOTE — CARE COORDINATION
Cm update note  PD #4   [facility] can accept - needs pre cert when ready. Updated Debbi (liaison) [facility] on  status. Also spoke with Dr Johnson at [facility]. He states not ready to initiate pre cert.    Following.      Camryn Eli RN

## 2024-08-26 NOTE — PROGRESS NOTES
[facility]     Electrophysiology                                     Progress Note    Admission date:  2024    Reason for follow up visit: AF    HPI/CC: Low Brooks was admitted on 2024 with shortness of breath. EKG showed AF. Echo showed an F of 25-30%. He was started on amiodarone with plans for restoration of sinus rhythm. He has also been treated for pneumonia, TRI and CHF. Rhythm has been AF with heart rates in the low 100's.     Subjective: He has no complaints. Wife at bedside.  She reports that patient was too winded to get out of bed into the chair.    Vitals:  Blood pressure 106/73, pulse (!) 117, temperature 97.3 °F (36.3 °C), temperature source Oral, resp. rate 24, height 1.93 m (6' 3.98\"), weight 122.8 kg (270 lb 11.2 oz), SpO2 95%.  Temp  Av.8 °F (36.6 °C)  Min: 97.1 °F (36.2 °C)  Max: 98.9 °F (37.2 °C)  Pulse  Av.9  Min: 81  Max: 118  BP  Min: 106/73  Max: 140/78  SpO2  Av.1 %  Min: 92 %  Max: 96 %    24 hour I/O    Intake/Output Summary (Last 24 hours) at 2024 1222  Last data filed at 2024 0839  Gross per 24 hour   Intake 1586.06 ml   Output 1400 ml   Net 186.06 ml     Current Facility-Administered Medications   Medication Dose Route Frequency Provider Last Rate Last Admin    furosemide (LASIX) injection 20 mg  20 mg IntraVENous BID Viraj Liu MD   20 mg at 24 1133    metoprolol tartrate (LOPRESSOR) tablet 25 mg  25 mg Oral BID Kim Moctezuma APRN - CNP   25 mg at 24 0832    amiodarone (CORDARONE) tablet 400 mg  400 mg Oral TID Kim Moctezuma APRN - CNP   400 mg at 24 0832    albuterol (PROVENTIL) (2.5 MG/3ML) 0.083% nebulizer solution 2.5 mg  2.5 mg Nebulization Q4H PRN Radha Tan, DO        ipratropium 0.5 mg-albuterol 2.5 mg (DUONEB) nebulizer solution 1 Dose  1 Dose Inhalation Q4H WA RT Radha Tan, DO   1 Dose at 24 1149    sodium chloride flush 0.9 % injection 5-40 mL  5-40 mL IntraVENous 2  tenderness  Bowel sounds present  Extremities:   No cyanosis or clubbing   1-2+ lower extremity edema, + bilateral upper extremity edema   Skin: warm and dry  Neurological:  Alert and oriented  Moves all extremities well  No abnormalities of mood, affect, memory, mentation, or behavior are noted    Data       Echo 8/23/2024    Image quality is adequate.    Left Ventricle: Severely reduced left ventricular systolic function with a visually estimated EF of 25 - 30%. Left ventricle is mildly dilated. Mild septal thickening. Severe global hypokinesis present. E/e' ratio >11, suggesting increased LAP.    Right Ventricle: Right ventricle is mildly dilated. Reduced systolic function.    Left Atrium: Left atrium is severely dilated.    Right Atrium: Right atrium is severely dilated.    Aortic Valve: Trileaflet valve. Moderate regurgitation.    Mitral Valve: Mild regurgitation.    Tricuspid Valve: Mild regurgitation. RVSP may be underestimated in the setting of poor visualization of TR jet. The estimated RVSP is 23 mmHg.    Pericardium: There is evidence of epicardial fat. Trivial localized pericardial effusion present around the left ventricle. No indication of cardiac tamponade.    Conclusion: Findings are consistent with dilated cardiomyopathy.  No prior echocardiograms for comparison.    All labs and testing reviewed.  Lab Review     Renal Profile:   Lab Results   Component Value Date/Time    CREATININE 1.3 08/26/2024 05:14 AM    BUN 84 08/26/2024 05:14 AM     08/26/2024 05:14 AM    K 4.0 08/26/2024 05:14 AM    K 3.8 08/24/2024 05:17 AM     08/26/2024 05:14 AM    CO2 19 08/26/2024 05:14 AM     CBC:    Lab Results   Component Value Date/Time    WBC 18.4 08/26/2024 05:14 AM    RBC 3.29 08/26/2024 05:14 AM    HGB 8.9 08/26/2024 05:14 AM    HCT 27.3 08/26/2024 05:14 AM    MCV 83.0 08/26/2024 05:14 AM    RDW 15.0 08/26/2024 05:14 AM     08/26/2024 05:14 AM     BNP:  No results found for: \"BNP\"  Fasting

## 2024-08-26 NOTE — FLOWSHEET NOTE
Vitals:    08/25/24 1954   BP: (!) 140/78   Pulse: (!) 118   Resp: 24   Temp: 98.5 °F (36.9 °C)   SpO2: 94%       08/25/24 1954   Assessment   Charting Type Shift assessment   Psychosocial   Psychosocial (WDL) X   Patient Behaviors Anxious   Family Behaviors Appropriate for situation;Calm;Cooperative   Neurological   Neuro (WDL) X   Level of Consciousness 0   Orientation Level Oriented to person;Oriented to place;Oriented to time;Oriented/disoriented at times;Disoriented to situation   Cognition Impulsive;Follows commands;Poor safety awareness   Speech Clear   R Pupil Size (mm) 3   R Pupil Shape Round   R Pupil Reaction Brisk   L Pupil Size (mm) 3   L Pupil Shape Round   L Pupil Reaction Brisk   R Hand  Strong   L Hand  Strong   R Foot Dorsiflexion Moderate   L Foot Dorsiflexion Moderate   R Foot Plantar Flexion Moderate   L Foot Plantar Flexion Moderate   RUE Motor Response Responds to command   RUE Sensation  Full sensation   LUE Motor Response Responds to command   LUE Sensation  Full sensation   RLE Motor Response Responds to command   RLE Sensation  Full sensation   LLE Motor Response Responds to command   LLE Sensation  Full sensation   Gag Present   Cough Reflex Present   [place] Coma Scale   Eye Opening 4   Best Verbal Response 4   Best Motor Response 6   [place] Coma Scale Score 14   NIHSS Stroke Scale   NIHSS Stroke Scale Assessed No   HEENT (Head, Ears, Eyes, Nose, & Throat)   HEENT (WDL) X   Right Eye Glasses   Left Eye Glasses   Right Ear Impaired hearing   Left Ear Impaired hearing   Teeth Missing teeth   Respiratory   Respiratory (WDL) X   Respiratory Interventions Cough & deep breathe   Respiratory Pattern Regular;Tachypneic   Respiratory Depth Shallow   Respiratory Quality/Effort Dyspnea with exertion   Chest Assessment Chest expansion symmetrical   L Breath Sounds Diminished;Expiratory Wheezes   R Breath Sounds Diminished;Expiratory Wheezes   Level of Activity/Mobility 2   Subcutaneous  Air/Crepitus None   Breath Sounds   Breath Sounds Bilateral Diminished;Expiratory wheezing   Right Upper Lobe Diminished;Expiratory wheezes   Right Middle Lobe Diminished   Right Lower Lobe Diminished   Left Upper Lobe Diminished;Expiratory wheezes   Left Lower Lobe Diminished   Cardiac   Cardiac (WDL) X   Cardiac Rhythm A fib RVR   Rhythm Interpretation   Pulse (!) 118   Cardiac Monitor   Cardiac/Telemetry Monitor On Portable telemetry pack applied   Alarm Audible Centralized cardiac monitoring   Alarms Set Centralized cardiac monitoring   Electrodes Replaced Yes   Bedside Monitor Alarm Parameters CMU   Telemetry Monitor Alarm Parameters PCU   Gastrointestinal   Abdominal (WDL) X   Pre Hospital Interventions Stool softener   Abdomen Inspection Rounded;Soft   Last BM (including prior to admit) 08/25/24   RUQ Bowel Sounds Active   LUQ Bowel Sounds Active   RLQ Bowel Sounds Active   LLQ Bowel Sounds Active   Genitourinary   Genitourinary (WDL) X   Flank Tenderness NOEL   Suprapubic Tenderness No   Dysuria (Pain/Burning w/Urination) NOEL   Difficulty Urinating/[place] NOEL   Urine Frequency   Urine Frequency NOEL   Urine Urgency   Urine Urgency NOEL   Urinary Retention   Urinary Retention Present   Urine Assessment   Urinary Status Gustafson   Urinary Incontinence Absent   Urine Color Yellow/straw   Urine Appearance Clear   Urine Odor No odor   Peripheral Vascular   Peripheral Vascular (WDL) X   Edema Generalized   Edema Generalized +1   RUE Neurovascular Assessment   Capillary Refill Less than/Equal to 3 seconds   Color Appropriate for Ethnicity   Temperature Warm   R Radial Pulse +2 (Moderate)   LUE Neurovascular Assessment   Capillary Refill Less than/Equal to 3 seconds   Color Appropriate for Ethnicity   Temperature Warm   L Radial Pulse +2 (Moderate)   RLE Neurovascular Assessment   Capillary Refill Less than/Equal to 3 seconds   Color Appropriate for Ethnicity   Temperature Warm   R Pedal Pulse +2   LLE  Neurovascular Assessment   Capillary Refill Less than/Equal to 3 seconds   Color Appropriate for Ethnicity   Temperature Warm   L Pedal Pulse +2   Anti-Embolism   Anti-Embolism Intervention Medication   Skin Integumentary    Skin Integumentary (WDL) X   Skin Color Pink   Skin Condition/Temp Dry;Warm   Skin Integrity Redness   Location coccyx   Skin Fold Management Patient refused   Nails X   Nail Condition Thick   Wound Prevention and Protection Methods   Location of Wound Prevention Coccyx;Heel   Orientation of Wound Prevention Mid;Posterior   Dressing Present  Yes;Changed   Skin Assessed Underneath Dressing This Shift Yes   Wound Offloading (Prevention Methods) Elevate heels;Pillows   Musculoskeletal   Musculoskeletal (WDL) X   RUE Weakness   LUE Weakness   RL Extremity Weakness   LL Extremity Weakness   Urinary Catheter 08/23/24 Gustafson   Placement Date/Time: 08/23/24 1003   Present on Admission/Arrival: No  Inserted by: YONY Mars  2nd Staff Assisting: YONY García  Insertion attempts: 1  Catheter Type: Gustafson  Catheter Balloon Size: 10 mL  Intra-abdominal Pressure Monitor: No  Urine ...   $ Urethral catheter insertion $ Not inserted for procedure   Catheter Indications Urinary retention (acute or chronic), continuous bladder irrigation or bladder outlet obstruction   Site Assessment Urethral drainage   Urine Color Yellow   Urine Appearance Clear   Urine Odor Malodorous   Collection Container Standard   Securement Method Securing device (Describe)   Catheter Care  Perineal wipes   Catheter Best Practices  Drainage tube clipped to bed;Catheter secured to thigh;Tamper seal intact;Bag below bladder;Bag not on floor;Lack of dependent loop in tubing;Drainage bag less than half full   Status Draining;Patent   Output (mL) 250 mL

## 2024-08-27 PROBLEM — J96.01 ACUTE RESPIRATORY FAILURE WITH HYPOXIA (HCC): Status: ACTIVE | Noted: 2024-08-27

## 2024-08-27 LAB
ANION GAP SERPL CALCULATED.3IONS-SCNC: 10 MMOL/L (ref 3–16)
BASE EXCESS BLDA CALC-SCNC: -14 MMOL/L (ref -3–3)
BASE EXCESS BLDA CALC-SCNC: -9 MMOL/L (ref -3–3)
BUN SERPL-MCNC: 108 MG/DL (ref 7–20)
CA-I BLD-SCNC: 1.08 MMOL/L (ref 1.12–1.32)
CA-I BLD-SCNC: 1.14 MMOL/L (ref 1.12–1.32)
CALCIUM SERPL-MCNC: 7.9 MG/DL (ref 8.3–10.6)
CHLORIDE SERPL-SCNC: 115 MMOL/L (ref 99–110)
CO2 BLDA-SCNC: 14 MMOL/L
CO2 BLDA-SCNC: 16 MMOL/L
CO2 SERPL-SCNC: 19 MMOL/L (ref 21–32)
CREAT SERPL-MCNC: 2 MG/DL (ref 0.8–1.3)
GFR SERPLBLD CREATININE-BSD FMLA CKD-EPI: 33 ML/MIN/{1.73_M2}
GLUCOSE BLD-MCNC: 142 MG/DL (ref 70–99)
GLUCOSE BLD-MCNC: 158 MG/DL (ref 70–99)
GLUCOSE BLD-MCNC: 161 MG/DL (ref 70–99)
GLUCOSE SERPL-MCNC: 136 MG/DL (ref 70–99)
HCO3 BLDA-SCNC: 13.1 MMOL/L (ref 21–29)
HCO3 BLDA-SCNC: 15.5 MMOL/L (ref 21–29)
HCT VFR BLD AUTO: 24 % (ref 40.5–52.5)
HCT VFR BLD AUTO: 28 % (ref 40.5–52.5)
HGB BLD CALC-MCNC: 8.2 GM/DL (ref 13.5–17.5)
HGB BLD CALC-MCNC: 9.5 GM/DL (ref 13.5–17.5)
LACTATE BLD-SCNC: 2.31 MMOL/L (ref 0.4–2)
LACTATE BLD-SCNC: 9.75 MMOL/L (ref 0.4–2)
PCO2 BLDA: 22.5 MM HG (ref 35–45)
PCO2 BLDA: 31 MM HG (ref 35–45)
PERFORMED ON: ABNORMAL
PH BLDA: 7.24 [PH] (ref 7.35–7.45)
PH BLDA: 7.45 [PH] (ref 7.35–7.45)
PO2 BLDA: 160.9 MM HG (ref 75–108)
PO2 BLDA: 17.6 MM HG (ref 75–108)
POC SAMPLE TYPE: ABNORMAL
POC SAMPLE TYPE: ABNORMAL
POTASSIUM BLD-SCNC: 4.2 MMOL/L (ref 3.5–5.1)
POTASSIUM BLD-SCNC: 4.6 MMOL/L (ref 3.5–5.1)
POTASSIUM SERPL-SCNC: 3.9 MMOL/L (ref 3.5–5.1)
SAO2 % BLDA: 100 % (ref 93–100)
SAO2 % BLDA: 20 % (ref 93–100)
SODIUM BLD-SCNC: 146 MMOL/L (ref 136–145)
SODIUM BLD-SCNC: 148 MMOL/L (ref 136–145)
SODIUM SERPL-SCNC: 144 MMOL/L (ref 136–145)

## 2024-08-27 PROCEDURE — 6370000000 HC RX 637 (ALT 250 FOR IP): Performed by: NURSE PRACTITIONER

## 2024-08-27 PROCEDURE — 99291 CRITICAL CARE FIRST HOUR: CPT | Performed by: INTERNAL MEDICINE

## 2024-08-27 PROCEDURE — 6370000000 HC RX 637 (ALT 250 FOR IP)

## 2024-08-27 PROCEDURE — 6370000000 HC RX 637 (ALT 250 FOR IP): Performed by: INTERNAL MEDICINE

## 2024-08-27 PROCEDURE — 82947 ASSAY GLUCOSE BLOOD QUANT: CPT

## 2024-08-27 PROCEDURE — 5A09357 ASSISTANCE WITH RESPIRATORY VENTILATION, LESS THAN 24 CONSECUTIVE HOURS, CONTINUOUS POSITIVE AIRWAY PRESSURE: ICD-10-PCS | Performed by: INTERNAL MEDICINE

## 2024-08-27 PROCEDURE — 84132 ASSAY OF SERUM POTASSIUM: CPT

## 2024-08-27 PROCEDURE — 83605 ASSAY OF LACTIC ACID: CPT

## 2024-08-27 PROCEDURE — 97530 THERAPEUTIC ACTIVITIES: CPT

## 2024-08-27 PROCEDURE — 2000000000 HC ICU R&B

## 2024-08-27 PROCEDURE — 94761 N-INVAS EAR/PLS OXIMETRY MLT: CPT

## 2024-08-27 PROCEDURE — 97110 THERAPEUTIC EXERCISES: CPT

## 2024-08-27 PROCEDURE — 2580000003 HC RX 258: Performed by: INTERNAL MEDICINE

## 2024-08-27 PROCEDURE — 6360000002 HC RX W HCPCS: Performed by: NURSE PRACTITIONER

## 2024-08-27 PROCEDURE — 36415 COLL VENOUS BLD VENIPUNCTURE: CPT

## 2024-08-27 PROCEDURE — 03HC33Z INSERTION OF INFUSION DEVICE INTO LEFT RADIAL ARTERY, PERCUTANEOUS APPROACH: ICD-10-PCS | Performed by: INTERNAL MEDICINE

## 2024-08-27 PROCEDURE — 2700000000 HC OXYGEN THERAPY PER DAY

## 2024-08-27 PROCEDURE — 6360000002 HC RX W HCPCS: Performed by: INTERNAL MEDICINE

## 2024-08-27 PROCEDURE — 94640 AIRWAY INHALATION TREATMENT: CPT

## 2024-08-27 PROCEDURE — 82330 ASSAY OF CALCIUM: CPT

## 2024-08-27 PROCEDURE — 2500000003 HC RX 250 WO HCPCS: Performed by: INTERNAL MEDICINE

## 2024-08-27 PROCEDURE — 99291 CRITICAL CARE FIRST HOUR: CPT | Performed by: NURSE PRACTITIONER

## 2024-08-27 PROCEDURE — 80048 BASIC METABOLIC PNL TOTAL CA: CPT

## 2024-08-27 PROCEDURE — 36620 INSERTION CATHETER ARTERY: CPT

## 2024-08-27 PROCEDURE — 94660 CPAP INITIATION&MGMT: CPT

## 2024-08-27 PROCEDURE — 37799 UNLISTED PX VASCULAR SURGERY: CPT

## 2024-08-27 PROCEDURE — 82803 BLOOD GASES ANY COMBINATION: CPT

## 2024-08-27 PROCEDURE — 85014 HEMATOCRIT: CPT

## 2024-08-27 PROCEDURE — 84295 ASSAY OF SERUM SODIUM: CPT

## 2024-08-27 RX ORDER — MILRINONE LACTATE 0.2 MG/ML
.0625-.75 INJECTION, SOLUTION INTRAVENOUS CONTINUOUS
Status: DISCONTINUED | OUTPATIENT
Start: 2024-08-27 | End: 2024-08-28 | Stop reason: HOSPADM

## 2024-08-27 RX ORDER — FUROSEMIDE 10 MG/ML
40 INJECTION INTRAMUSCULAR; INTRAVENOUS ONCE
Status: DISCONTINUED | OUTPATIENT
Start: 2024-08-27 | End: 2024-08-28 | Stop reason: HOSPADM

## 2024-08-27 RX ORDER — METOPROLOL TARTRATE 1 MG/ML
5 INJECTION, SOLUTION INTRAVENOUS ONCE
Status: DISCONTINUED | OUTPATIENT
Start: 2024-08-27 | End: 2024-08-28 | Stop reason: HOSPADM

## 2024-08-27 RX ORDER — MUPIROCIN 20 MG/G
OINTMENT TOPICAL 2 TIMES DAILY
Status: DISCONTINUED | OUTPATIENT
Start: 2024-08-27 | End: 2024-08-28 | Stop reason: HOSPADM

## 2024-08-27 RX ORDER — MIDODRINE HYDROCHLORIDE 5 MG/1
5 TABLET ORAL
Status: DISCONTINUED | OUTPATIENT
Start: 2024-08-27 | End: 2024-08-28 | Stop reason: HOSPADM

## 2024-08-27 RX ORDER — CLOTRIMAZOLE 1 %
CREAM (GRAM) TOPICAL 2 TIMES DAILY
Status: DISCONTINUED | OUTPATIENT
Start: 2024-08-27 | End: 2024-08-28 | Stop reason: HOSPADM

## 2024-08-27 RX ADMIN — IPRATROPIUM BROMIDE AND ALBUTEROL SULFATE 1 DOSE: 2.5; .5 SOLUTION RESPIRATORY (INHALATION) at 11:41

## 2024-08-27 RX ADMIN — CLOTRIMAZOLE: 10 CREAM TOPICAL at 21:09

## 2024-08-27 RX ADMIN — ENOXAPARIN SODIUM 120 MG: 150 INJECTION SUBCUTANEOUS at 21:08

## 2024-08-27 RX ADMIN — CEFTRIAXONE SODIUM 1000 MG: 1 INJECTION, POWDER, FOR SOLUTION INTRAMUSCULAR; INTRAVENOUS at 09:19

## 2024-08-27 RX ADMIN — MUPIROCIN: 20 OINTMENT TOPICAL at 21:09

## 2024-08-27 RX ADMIN — METOPROLOL SUCCINATE 25 MG: 25 TABLET, EXTENDED RELEASE ORAL at 09:14

## 2024-08-27 RX ADMIN — SODIUM BICARBONATE 50 MEQ: 84 INJECTION INTRAVENOUS at 16:19

## 2024-08-27 RX ADMIN — ENOXAPARIN SODIUM 120 MG: 150 INJECTION SUBCUTANEOUS at 09:15

## 2024-08-27 RX ADMIN — MUPIROCIN: 20 OINTMENT TOPICAL at 14:58

## 2024-08-27 RX ADMIN — AMIODARONE HYDROCHLORIDE 400 MG: 200 TABLET ORAL at 09:14

## 2024-08-27 RX ADMIN — DOXYCYCLINE HYCLATE 100 MG: 100 TABLET, COATED ORAL at 09:14

## 2024-08-27 RX ADMIN — SODIUM CHLORIDE, PRESERVATIVE FREE 10 ML: 5 INJECTION INTRAVENOUS at 21:09

## 2024-08-27 RX ADMIN — AMIODARONE HYDROCHLORIDE 400 MG: 200 TABLET ORAL at 21:08

## 2024-08-27 RX ADMIN — FUROSEMIDE 5 MG/HR: 10 INJECTION, SOLUTION INTRAMUSCULAR; INTRAVENOUS at 14:48

## 2024-08-27 RX ADMIN — IPRATROPIUM BROMIDE AND ALBUTEROL SULFATE 1 DOSE: 2.5; .5 SOLUTION RESPIRATORY (INHALATION) at 15:36

## 2024-08-27 RX ADMIN — MILRINONE LACTATE IN DEXTROSE 0.12 MCG/KG/MIN: 200 INJECTION, SOLUTION INTRAVENOUS at 16:30

## 2024-08-27 RX ADMIN — Medication 10 ML: at 16:19

## 2024-08-27 RX ADMIN — GUAIFENESIN 600 MG: 600 TABLET ORAL at 21:08

## 2024-08-27 RX ADMIN — IPRATROPIUM BROMIDE AND ALBUTEROL SULFATE 1 DOSE: 2.5; .5 SOLUTION RESPIRATORY (INHALATION) at 19:54

## 2024-08-27 RX ADMIN — SODIUM CHLORIDE, PRESERVATIVE FREE 10 ML: 5 INJECTION INTRAVENOUS at 09:19

## 2024-08-27 RX ADMIN — GUAIFENESIN 600 MG: 600 TABLET ORAL at 09:14

## 2024-08-27 NOTE — PROGRESS NOTES
[facility]   Progress Note  Cardiology    CC: SOB    HPI: Had improved initially but significant decline over las 12 hours. Now w/ severe resp distress.     Past Medical History   has a past medical history of Hyperlipidemia and Hypertension.    Past Surgical History   has no past surgical history on file.    Social History       Family History  family history is not on file.    Medications  Prior to Admission medications    Medication Sig Start Date End Date Taking? Authorizing Provider   atorvastatin (LIPITOR) 20 MG tablet Take 1 tablet by mouth daily   Yes Provider, MD Mellissa   amLODIPine (NORVASC) 10 MG tablet Take 1 tablet by mouth daily   Yes Provider, Historical, MD       Allergies  Patient has no allergy information on record.    Review of Systems:   Reviewed. No changes except as noted in HPI and A/P      Lab Results   Component Value Date    WBC 18.4 (H) 08/26/2024    HGB 8.9 (L) 08/26/2024    HCT 27.3 (L) 08/26/2024    MCV 83.0 08/26/2024     (H) 08/26/2024     Lab Results   Component Value Date    CREATININE 2.0 (H) 08/27/2024     (HH) 08/27/2024     08/27/2024    K 3.9 08/27/2024     (H) 08/27/2024    CO2 19 (L) 08/27/2024     Lab Results   Component Value Date    INR 1.27 (H) 08/22/2024    PROTIME 16.1 (H) 08/22/2024       I reviewed EKGs and radiology imaging. Pertinent findings and changes as described in assessment below.      Physical Examination:    BP 95/62   Pulse 100   Temp 97.8 °F (36.6 °C) (Axillary)   Resp 30   Ht 1.93 m (6' 3.98\")   Wt 123 kg (271 lb 3.2 oz)   SpO2 98%   BMI 33.03 kg/m²      General Appearance:  Alert, no distress, appears stated age   Head:  Normocephalic, without obvious abnormality, atraumatic   Eyes:  PERRL, conjunctiva/corneas clear         Nose: Nares normal, no drainage or sinus tenderness   Throat: Lips, mucosa, and tongue normal   Neck: Supple, symmetrical, trachea midline, no adenopathy         Lungs:   rales to  auscultation bilaterally    Chest Wall:  No tenderness or deformity   Heart:  irregular rate and rhythm, S1, S2 normal, no murmur, rub or gallop   Abdomen:   Soft, non tender, normal bowel sounds                Extremities: 2+ edema   Pulses: 2+ and symmetric   Skin: Skin color, texture, turgor normal, no rashes    Pysch: Normal mood and affect   Neurologic: Normal gross motor and sensory exam.        Assessment:    Atrial fibrillation - onset unknown, rates again elevated due to resp distress    Tachycardia  Shortness of breath - worse  Resp failure: multifactorial. Intravascular volume status difficult to definitively determine. CXR more c/w PNA, BNP down, RVSP 26 mmHg, BUN rising. All suggest not sig volume overload despite edema and low EF. Ideally would perform RHC but he is not stable and wife declines. Reasonable to attempt lasix drip and assess clinical response.   Dilated cardiomyopathy, severe, EF 25-30%  Acute sCHF  Resp failure  Hypoxia  PNA  TRI  Elev trop  HTN  HLD    Plan  Lasix drip  Milrinone drip  Monitor labs for toxicity   Discussed w/ wife, pulmonary, nephrology     Due to the high probability of clinically significant life threating deterioration of the patient's condition that required my urgent intervention, a total critical care time 60 minutes was used. This time excludes any time that may have been spent performing procedures. This includes but not limited to vital sign monitoring, telemetry monitoring, continuous pulse oximety, IV medication, clinical response to the IV medications, documentation time , consultation time, interpretation of lab data, review of nursing notes and old record review.       Ankush Posadas MD, 8/27/2024 12:29 PM

## 2024-08-27 NOTE — CARE COORDINATION
Chart reviewed, pt discussed during huddle with primary RN/Mirna.  Nephro, Cardio/EP involved.  CM will continue to follow pt's progress and coordinate discharge arrangements, plan has been for [facility] skilled but no precert appropriate yet.  KATHRYN Fine-RN

## 2024-08-27 NOTE — PLAN OF CARE
Problem: Discharge Planning  Goal: Discharge to home or other facility with appropriate resources  8/26/2024 2100 by Kishan Downs RN  Outcome: Progressing     Problem: Safety - Adult  Goal: Free from fall injury  8/27/2024 1014 by Mirna Sierra RN  Outcome: Progressing  8/26/2024 2100 by Kishan Downs RN  Outcome: Progressing     Problem: Pain  Goal: Verbalizes/displays adequate comfort level or baseline comfort level  8/27/2024 1014 by Mirna Sierra RN  Outcome: Adequate for Discharge  8/26/2024 2100 by Kishan Downs RN  Outcome: Progressing     Problem: Skin/Tissue Integrity  Goal: Absence of new skin breakdown  Description: 1.  Monitor for areas of redness and/or skin breakdown  2.  Assess vascular access sites hourly  3.  Every 4-6 hours minimum:  Change oxygen saturation probe site  4.  Every 4-6 hours:  If on nasal continuous positive airway pressure, respiratory therapy assess nares and determine need for appliance change or resting period.  8/27/2024 1014 by Mirna Sierra RN  Outcome: Adequate for Discharge  8/26/2024 2100 by Kishan Downs RN  Outcome: Progressing

## 2024-08-27 NOTE — PLAN OF CARE
Problem: Discharge Planning  Goal: Discharge to home or other facility with appropriate resources  Outcome: Progressing     Problem: Pain  Goal: Verbalizes/displays adequate comfort level or baseline comfort level  8/27/2024 1804 by Kusum Oleary, RN  Outcome: Progressing  Problem: Skin/Tissue Integrity  Goal: Absence of new skin breakdown  Description: 1.  Monitor for areas of redness and/or skin breakdown  2.  Assess vascular access sites hourly  3.  Every 4-6 hours minimum:  Change oxygen saturation probe site  4.  Every 4-6 hours:  If on nasal continuous positive airway pressure, respiratory therapy assess nares and determine need for appliance change or resting period.  8/27/2024 1804 by Kusum Oleary, RN  Outcome: Progressing     Problem: Safety - Adult  Goal: Free from fall injury  8/27/2024 1804 by Kusum Oleary, RN  Outcome: Progressing  Transferred to ICU for change in condition

## 2024-08-27 NOTE — PROGRESS NOTES
Patient is in respiratory distress at around 2200. Shabnam Tyson NP notified. CXRAY, VBG and 40 Lasix IV given to patient.     Electronically signed: Kishan Downs RN

## 2024-08-27 NOTE — PLAN OF CARE
Problem: Discharge Planning  Goal: Discharge to home or other facility with appropriate resources  8/26/2024 2100 by Kishan Downs RN  Outcome: Progressing  8/26/2024 0913 by Jazmin Timmons RN  Outcome: Progressing     Problem: Pain  Goal: Verbalizes/displays adequate comfort level or baseline comfort level  8/26/2024 2100 by Kishan Downs RN  Outcome: Progressing  8/26/2024 0913 by Jazmin Timmons RN  Outcome: Progressing     Problem: Skin/Tissue Integrity  Goal: Absence of new skin breakdown  Description: 1.  Monitor for areas of redness and/or skin breakdown  2.  Assess vascular access sites hourly  3.  Every 4-6 hours minimum:  Change oxygen saturation probe site  4.  Every 4-6 hours:  If on nasal continuous positive airway pressure, respiratory therapy assess nares and determine need for appliance change or resting period.  8/26/2024 2100 by Kishan Downs RN  Outcome: Progressing  8/26/2024 0913 by Jazmin Timmons RN  Outcome: Progressing     Problem: Safety - Adult  Goal: Free from fall injury  8/26/2024 2100 by Kishan Downs RN  Outcome: Progressing  8/26/2024 0913 by Jazmin Timmons RN  Outcome: Progressing

## 2024-08-27 NOTE — PROGRESS NOTES
Hospital Medicine Progress Note      Date of Admission: 8/22/2024  Hospital Day: 6      Chief Admission Complaint:  Shortness of breath, Atrial fibrillation      Subjective:  Patient seen and examined this morning. Noted inc'd wob today, on 7L now, had some tripoding this morning per nursing, pt is very stoic about his breathing and denies signif sob, resting in chair today     Presenting Admission History:        83 y.o. male who presented to the emergency room with a chief complaint of shortness of breath and feeling generalized fatigue and weakness.       Upon arrival to the emergency room patient was noted to have elevated heart rate ranging from 130s to 170s  Twelve-lead EKG reveals A-fib with RVR patient workup was started with IV diltiazem bolus of 10 mg with little effect on heart rate patient received another 20 mg followed by diltiazem drip  Patient's heart rate gradually improved in the range of 130s     Currently on no home medications available.  The workup in the emergency room notable for:     Upon arrival to the emergency room patient's saturation was 86% on room air.  Patient placed on high flow and saturation increased to 93% at 6 L        Assessment/Plan:       Current Principal Problem:  Paroxysmal A-fib (HCC)     Atrial Fibrillation with RVR   Elevated BNP   Elevated Troponin   - Elevated BNP and trop likely secondary to a. Fib   - Echo revealed an EF of 25-30%  - Full dose Lovenox   - Several IV pushes of metoprolol   - Cardiology consulted: increase amiodarone 400 mg daily, start metoprolol 25 mg BID, discontinue amiodarone gtt       Acute Hypoxic Respiratory Failure   - Secondary to PNA and volume overload /cardiomyopathy  - IV lasix given  - Continued Abx   - Wean O2 as able      Community Acquired Pneumonia   - CXR shows potential PNA   - Elevated WBC continues to down trend   - Continued course of ceftriaxone and doxycycline      Acute Kidney Injury   Elevated BUN   - Cr improved   -      ------------------------------------------------------------------------------------------------------------------------------------------------------------------------    MDM      [x] High (any 2)    A. Problems (any 1)  [x] Acute/Chronic Illness/injury posing threat to life or bodily function:    [] Severe exacerbation of chronic illness:    ---------------------------------------------------------------------  B. Risk of Treatment (any 1)   [] Drugs/treatments that require intensive monitoring for toxicity include:    [] IV ABX requiring serial renal monitoring for nephrotoxicity:     [] IV Narcotic analgesia for adverse drug reaction  [] IV diuresis requiring serial monitoring for renal impairment and electrolyte derangements  [] Critical electrolyte abnormalities requiring IV replacement and close serial monitoring  [] Insulin - monitoring serial FSBS for Hypoglycemic adverse drug reaction  [] Anticoagulation requiring serial monitoring of coagulation factors  [] Other -   [] Change in code status:    [] Decision to escalate care:    [] Major surgery/procedure with associated risk factors:    ----------------------------------------------------------------------  C. Data (any 2)  [x] Discussed current management and discharge planning options with Case Management.  [] Discussed management of the case with:    [] Telemetry personally reviewed and interpreted as documented above    [] Imaging personally reviewed and interpreted, includes:    [x] Data Review (any 3)  [x] All available Consultant notes from yesterday/today were reviewed  [x] All current labs were reviewed and interpreted for clinical significance   [x] Appropriate follow-up labs were ordered  [] Collateral history obtained from:        Medications:  Personally reviewed in detail in conjunction w/ labs as documented for evidence of drug toxicity.     Infusion Medications    furosemide (LASIX) 500 mg in sodium chloride 0.9 % 100 mL infusion 5  mg/hr (08/27/24 1448)    milrinone      sodium chloride       Scheduled Medications    midodrine  5 mg Oral TID WC    furosemide  40 mg IntraVENous Once    metoprolol  5 mg IntraVENous Once    mupirocin   Topical BID    sodium bicarbonate  50 mEq IntraVENous Once    amiodarone  400 mg Oral BID    [Held by provider] metoprolol succinate  25 mg Oral BID    ipratropium 0.5 mg-albuterol 2.5 mg  1 Dose Inhalation Q4H WA RT    sodium chloride flush  5-40 mL IntraVENous 2 times per day    cefTRIAXone (ROCEPHIN) IV  1,000 mg IntraVENous Q24H    enoxaparin  1 mg/kg (Order-Specific) SubCUTAneous BID    guaiFENesin  600 mg Oral BID     PRN Meds: albuterol, sodium chloride flush, sodium chloride, ondansetron **OR** ondansetron, polyethylene glycol, acetaminophen **OR** acetaminophen     Labs:  Personally reviewed and interpreted for clinical significance.     Recent Labs     08/25/24  0611 08/26/24  0514 08/27/24  1326 08/27/24  1546   WBC 16.3* 18.4*  --   --    HGB 10.0* 8.9* 9.5* 8.2*   HCT 30.7* 27.3*  --   --    * 516*  --   --      Recent Labs     08/25/24  0611 08/26/24  0514 08/27/24  0539    140 144   K 3.8 4.0 3.9   * 109 115*   CO2 20* 19* 19*   BUN 70* 84* 108*   CREATININE 1.3 1.3 2.0*   CALCIUM 7.9* 7.9* 7.9*   MG 2.60*  --   --      Recent Labs     08/25/24  0611   PROBNP 6,749*     No results for input(s): \"LABA1C\" in the last 72 hours.  Recent Labs     08/25/24  0611   AST 47*   ALT 59*   BILIDIR 0.2   BILITOT 0.3   ALKPHOS 199*     No results for input(s): \"INR\", \"LACTA\", \"TSH\" in the last 72 hours.    Urine Cultures: No results found for: \"LABURIN\"  Blood Cultures:   Lab Results   Component Value Date/Time    BC No Growth after 4 days of incubation. 08/22/2024 04:36 PM     Lab Results   Component Value Date/Time    BLOODCULT2 No Growth after 4 days of incubation. 08/22/2024 04:36 PM     Organism: No results found for: \"ORG\"      Cory Johnson MD

## 2024-08-27 NOTE — PROGRESS NOTES
1318: Pt transferred to new bed 235.   1320: Laurel Ni NP at bedside. BiPAP reapplied. ABG being obtained  Kusum BHAKTA to assume care from C4 RN  Gas is venous. Ph 7.23. Lactic 9.175

## 2024-08-27 NOTE — PROGRESS NOTES
CHF Care Plan      Patient's EF (Ejection Fraction) is less than 40%    Heart Failure Medications:  Diuretics:: Furosemide    (One of the following REQUIRED for EF </= 40%/SYSTOLIC FAILURE but MAY be used in EF% >40%/DIASTOLIC FAILURE)        ACE:: None        ARB:: None         ARNI:: None    (Beta Blockers)  NON- Evidenced Based Beta Blocker (for EF% >40%/DIASTOLIC FAILURE): None    Evidenced Based Beta Blocker::(REQUIRED for EF% <40%/SYSTOLIC FAILURE) None  ...................................................................................................................................................    Failed to redirect to the Timeline version of the [employer] SmartLink.      Patient's weights and intake/output reviewed    Daily Weight log at bedside, patient/family participation in use of log: \"yes    Patient's current weight today shows a difference of 1 lbs less than last documented weight.      Intake/Output Summary (Last 24 hours) at 8/27/2024 1105  Last data filed at 8/27/2024 0351  Gross per 24 hour   Intake 240 ml   Output 1275 ml   Net -1035 ml       Education Booklet Provided: yes    Comorbidities Reviewed Yes    Patient has a past medical history of Hyperlipidemia and Hypertension.     >>For CHF and Comorbidity documentation on Education Time and Topics, please see Education Tab      Pt up in chair at this time on  7 L O2. Pt with complaints of shortness of breath. Pt with pitting lower extremity edema.     Patient and/or Family's stated Goal of Care this Admission: reduce shortness of breath, increase activity tolerance, better understand heart failure and disease management, be more comfortable, and reduce lower extremity edema prior to discharge        :

## 2024-08-27 NOTE — PROGRESS NOTES
Occupational Therapy  Facility/Department: [facility] C4 PCU  Daily Treatment Note  NAME: Low Brooks  : 1940  MRN: [id number]    Date of Service: 2024    Discharge Recommendations:  Subacute/Skilled Nursing Facility       Therapy discharge recommendations are subject to collaboration from the patient’s interdisciplinary healthcare team, including MD and case management recommendations.  Barriers to Home Discharge:   [] Steps to access home entry or bed/bath   [x] Unable to transfer, ambulate, or propel wheelchair household distances without assist   [x] Limited available assist at home upon discharge    [] Patient or family requests d/c to post-acute facility    [x] Poor cognition/safety awareness for d/c to home alone    [] Unable to maintain ordered weight bearing status    [] Patient with salient signs of long-standing immobility   [x] Decreased independence with ADLs   [x] Increased risk for falls   [] Other:     Patient Diagnosis(es): The primary encounter diagnosis was Pneumonia of right lower lobe due to infectious organism. Diagnoses of Septicemia (HCC), Atrial fibrillation with rapid ventricular response (HCC), Atrial fibrillation, unspecified type (HCC), and Shortness of breath were also pertinent to this visit.     Assessment   Assessment: Pt tolerated session fair, requiring frequent encouragement for active participation in OOB activity and therex. Co-tx collaboration this date with PT staff to safely progress pt toward goals. Pt will have better occupational performance outcomes within a co-treatment than 1:1 session. Pt requiring Ax2 to facilitate bed mobility, completed 2x during session due to poor tolerance of sitting EOB, pt with difficulty verbalizing why he was requesting to lay down, does not report pain or fatigue specifically however appears to fatigue quickly. Pt requires frequent reminders for PLB and demos difficulty with return demo of PLB. Pt functioning below his  baseline, continue to recommend SNF at d/c.   Activity Tolerance: Patient limited by endurance;Patient limited by fatigue  Discharge Recommendations: Subacute/Skilled Nursing Facility     Plan  Occupational Therapy Plan  Times Per Week: 3-4x/week    Restrictions  Restrictions/Precautions  Restrictions/Precautions: Fall Risk  Position Activity Restriction  Other position/activity restrictions: crow, tele, high flow O2    Subjective  Subjective  Subjective: Pt resting in bed at approach, with encouraement agreeable to OT treatment.    Orientation  Overall Orientation Status: Impaired  Orientation Level: Oriented to person;Oriented to place;Disoriented to situation;Disoriented to time    Pain: Pt denies pain.    Cognition  Overall Cognitive Status: Exceptions  Arousal/Alertness: Appears intact  Following Commands: Follows one step commands with increased time;Follows one step commands with repetition  Attention Span: Attends with cues to redirect  Memory: Decreased recall of precautions;Decreased recall of recent events;Decreased short term memory  Safety Judgement: Impaired;Decreased awareness of need for safety;Decreased awareness of need for assistance  Problem Solving: Assistance required to implement solutions;Assistance required to correct errors made;Impaired  Insights: Decreased awareness of deficits  Initiation: Requires cues for some  Sequencing: Requires cues for some       Objective  Vitals  Vitals:    08/27/24 0833   BP: 95/62   Pulse: 100   Resp: 30   Temp: 97.8 °F (36.6 °C)   SpO2: 96%        Bed Mobility Training  Bed Mobility Training: Yes  Interventions: Verbal cues;Visual cues;Safety awareness training;Weight shifting training/pressure relief  Supine to Sit: Moderate assistance;Assist X2;Additional time;Adaptive equipment  Sit to Supine: Moderate assistance;Assist X2  Scooting: Minimum assistance    Balance  Sitting: Impaired  Sitting - Static: Fair (occasional)  Sitting - Dynamic: Fair

## 2024-08-27 NOTE — CONSULTS
PULMONARY AND CRITICAL CARE INPATIENT NOTE        Low Brooks   : 1940  MRN: [id number]     Admitting Physician: Elsi Tinoco MD  Attending Physician: Cory Johnson MD  PCP: Pavel Monroe MD    Admission: 2024   Date of Service: 2024    Chief Complaint   Patient presents with    Fatigue    Shortness of Breath     Patient arrives from home C/O generalized weakness and SOB. Patient reports signs and symptoms have been ongoing for \"months.\" Patient denies chest pain, denies N/V. EMS reports patient does not wear oxygen at baseline, 86% RA, placed on 2L NC initially but titrated to 4L NC. Patient poor historian, unknown medical and surgical history.            ASSESSMENT & PLAN       83 y.o. pleasant  male patient with:    Assessment:  Acute critical hypoxemic respiratory failure.  Transferred to ICU with oxygen saturation in the 70s.  Critical lactic acidosis  A-fib with RVR  Dilated cardiomyopathy, HFrEF with EF 25 to 30%.  Acute decompensation  Acute pulmonary edema/volume overload  Lung infiltrates/community-acquired pneumonia/aspiration with volume overload likely  Pitting lower extremity edema +3  Leukocytosis  TRI  Anemia of chronic disease  Acute transaminitis  Metabolic syndrome: HTN, HLD, body habitus  Possible undiagnosed sleep apnea  Hiatal hernia      Plan:              Start CPAP for acute hypoxic respiratory failure.  Patient likely has undiagnosed/untreated sleep apnea.    Repeat blood gas/lactic acid  Start Lasix drip.  Nephrology on board  Agreed with cardiology and considering low-dose milrinone if needed.    Continue amiodarone, anticoagulation per EP/cardiology  Previous plans for LIAM/cardioversion once euvolemic on hold  Continue antibiotics for presumed pneumonia  Follow-up micro results  N.p.o.  Aspiration precautions  Family decided on no intubation  Target blood sugar between 140 and 180 mg/dL  DVT ppx measures.  On therapeutic Lovenox  Continue to  wean oxygen with target 90 to 94%.    LOS: Hospital Day: 6     Code:Limited    Critical care time spent on this patient, excluding procedures time, is ~35 minutes.  Critical care time was spent on reviewing overnight events, interpreting labs and imaging, direct bedside exam, ventilator/breathing assistance device/drip review and adjustment, running multidisciplinary rounds and coordinating critical care plan with other providers.      Subjective/Objective           CC/Reason for Consult: Acute hypoxic respiratory failure        HPI: August 27, 2024  83-year-old male patient with PMH of PAF, dilated cardiomyopathy who was admitted on August 22 with shortness of breath, hypoxic respiratory failure and decompensated congestive heart failure as well as A-fib and RVR.  Patient had acute kidney injury and picture of pneumonia.  He was started on antibiotics, nebulizers, amiodarone drip.  Nephrology consulted and started IV fluids.  Patient transferred to the ICU on August 27 due to worsening respiratory status, oxygen needs and signs of worsening volume overload.  Cardiology/electrophysiology was following and patient was switched to p.o. amiodarone and remained on anticoagulation with Lovenox therapeutic dose, Toprol but other ACE/ARB's or ARNI's not started due to to TRI.  Plan for LIAM cardioversion once patient is euvolemic    The following portions of the patient's history were reviewed: past medical history, surgical history, family history, social history, current medications & allergies.     ROS:   Relevant systems reviewed and the only positive symptoms are mentioned in the history present illness.      Objective    Test Results:  Imaging:  I have reviewed radiology images personally.    XR CHEST PORTABLE    Result Date: 8/27/2024  1. Right infrahilar, retrocardiac, and right mid lung zone opacities which may represent pneumonia in the appropriate clinical setting.  Recommend treating the patient and following to  resolution to exclude underlying masses. 2. Cardiomegaly. 3. Hiatal hernia.          PFTS:  NA      Cardiology:      Sleep:  NA      Physical Exam:  General appearance: Critically ill in severe distress  HEENT: On BiPAP breathing in the 40s  Cardiac: Difficult to appreciate and hear heart sounds  Lungs: Good air entry bilaterally.  Breathing 40/min with volumes more than 1 L on the BiPAP/CPAP  Abdomen: Soft.  Back & Extremities: Pitting edema +3  Neurological: No focal deficit..  Little confused but answers questions      ________________________________________________________  Electronically signed by:  Christian Wheatley MD,FACP    8/27/2024    1:27 PM.     [facility]  [address], [place] [address]   Phone (office): [phone]

## 2024-08-27 NOTE — PROGRESS NOTES
CHF Care Plan      Patient's EF (Ejection Fraction) is less than 40%    Heart Failure Medications:  Diuretics:: Furosemide  (One of the following REQUIRED for EF </= 40%/SYSTOLIC FAILURE but MAY be used in EF% >40%/DIASTOLIC FAILURE)        ACE:: None        ARB:: None         ARNI:: None    (Beta Blockers)  NON- Evidenced Based Beta Blocker (for EF% >40%/DIASTOLIC FAILURE): none      Evidenced Based Beta Blocker::(REQUIRED for EF% <40%/SYSTOLIC FAILURE) Metoprolol SUCCinate- Toprol XL  ...................................................................................................................................................    Failed to redirect to the Timeline version of the [employer] SmartLink.      Patient's weights and intake/output reviewed    Daily Weight log at bedside, patient/family participation in use of log: \"yes    Patient's current weight today shows a difference of 1 lbs more than last documented weight.      Intake/Output Summary (Last 24 hours) at 8/27/2024 0633  Last data filed at 8/27/2024 0351  Gross per 24 hour   Intake 310 ml   Output 1600 ml   Net -1290 ml       Education Booklet Provided: yes    Comorbidities Reviewed Yes    Patient has a past medical history of Hyperlipidemia and Hypertension.     >>For CHF and Comorbidity documentation on Education Time and Topics, please see Education Tab      Pt resting in bed at this time on  6 L O2. Pt with complaints of shortness of breath. Pt with pitting lower extremity edema.     Patient and/or Family's stated Goal of Care this Admission: reduce shortness of breath, increase activity tolerance, better understand heart failure and disease management, be more comfortable, and reduce lower extremity edema prior to discharge        :

## 2024-08-27 NOTE — PROGRESS NOTES
Shift: 0700 -1900      Admitting diagnosis: Fatique, Shortness of breath    Presentation to hospital: generalized weakness & shortness of breath    Surgery: no     Nursing assessment at handoff  stable    Emergency Contact/POA:Brianna- wife  Family updated: yes - updated by MDs & RNs at bedside    Most recent vitals: BP (!) 130/117   Pulse (!) 105   Temp 97.8 °F (36.6 °C) (Axillary)   Resp 23   Ht 1.93 m (6' 3.98\")   Wt 123 kg (271 lb 3.2 oz)   SpO2 100%   BMI 33.03 kg/m²      Rhythm: Atrial Fibrillation      NC/HFNC-  lpm  Respiratory support: - No ventilator support  - CPAP   continuous    Vent days: Day 0    Increased O2 requirements: yes -     Admission weight Weight - Scale: 122.5 kg (270 lb)  Today's weight   Wt Readings from Last 1 Encounters:   08/27/24 123 kg (271 lb 3.2 oz)         UOP >30ml/hr: yes -      Crow need assessed each shift: yes, for retention    Restraints: no  Order current and documentation up to date?    Lines/Drains  LDA Insertion Date Discontinued Date Dressing Changes   PIV       TLC       Arterial       Crow       Vas Cath      ETT       Surgical drains        Night Shift Hospitalist Interventions    Problem(Brief) Date Time Intervention Physician contacted                                               Drip rates at handoff:    furosemide (LASIX) 500 mg in sodium chloride 0.9 % 100 mL infusion 5 mg/hr (08/27/24 1901)    milrinone 0.125 mcg/kg/min (08/27/24 1901)    sodium chloride         Hospital Course Daily Updates:  Admit Day# 5 Days 8/27/24  - Rapid response at 1230 on C4 & transferred to ICU for respiratory distress  - Cpap continuous  -CT chest + PNA & on ATB  -Davina placed  - Lasix gtt  - Milrinone gtt  - Consults Nephrology, Critical care, Hospitalist & EP Cardiology  - Code status changed to Limited x2, no CPR & no intubation  - crow for retention  - ABGs x2  - Lactic  down to 2.3 from 9      Lab Data:   CBC:   Recent Labs     08/25/24  0611 08/26/24  0514

## 2024-08-27 NOTE — PROGRESS NOTES
08/27/24 1320   NIV Type   $NIV $Daily Charge   NIV Started/Stopped On   Equipment Type v60   Mode Bilevel   Mask Type Full face mask   Mask Size Large   Bonnet size Large   Assessment   Level of Consciousness 0   Comfort Level Good   Mask Compliance Fair   Skin Assessment Clean, dry, & intact   Skin Protection for O2 Device Yes   Orientation Middle   Location Nose   Intervention(s) Skin Barrier   Breath Sounds   Respiratory Pattern Tachypneic   Breath Sounds Bilateral Diminished   Settings/Measurements   PIP Observed 25 cm H20   IPAP 12 cmH20   CPAP/EPAP 5 cmH2O   Vt (Measured) 1194 mL   Rate Ordered 16   FiO2  50 %   I Time/ I Time % 1 s   Minute Volume (L/min) 46.8 Liters   Mask Leak (lpm) 28 lpm   Patient's Home Machine No   Alarm Settings   Alarms On Y   Low Pressure (cmH2O) 8 cmH2O   High Pressure (cmH2O) 30 cmH2O   Apnea (secs) 20 secs   RR Low (bpm) 6   RR High (bpm) 40 br/min

## 2024-08-27 NOTE — PROGRESS NOTES
Physical Therapy  Facility/Department: [facility] C4 PCU  Daily Treatment Note  NAME: Low Brooks  : 1940  MRN: [id number]    Date of Service: 2024    Discharge Recommendations:  Subacute/Skilled Nursing Facility   PT Equipment Recommendations  Equipment Needed: No  Other: Defer    Patient Diagnosis(es): The primary encounter diagnosis was Pneumonia of right lower lobe due to infectious organism. Diagnoses of Septicemia (HCC), Atrial fibrillation with rapid ventricular response (HCC), Atrial fibrillation, unspecified type (HCC), and Shortness of breath were also pertinent to this visit.    Assessment  Assessment: Pt limited in progressions this date due to weakness and SOB with activity. Max cues needed for PLB again today with poor carryover when cues stopped.  Pt required increased assistance up to max Ax2 for bed mobility and STS in stedy. Pt perfomred a couple exs in chair but required alot of extra tie to complete due to VEE. Pt ended session due to fatigue. Pt would continue to benefit from skilled therapy during LOS to progress mobility as tolerated. Continue to recommend SNF at d/c.  Activity Tolerance: Patient limited by endurance;Patient limited by fatigue  Equipment Needed: No  Other: Defer    Plan  Physical Therapy Plan  General Plan: 3-5 times per week  Current Treatment Recommendations: Strengthening;ROM;Balance training;Gait training;Stair training;Functional mobility training;Therapeutic activities;Home exercise program;Safety education & training;Co-Treatment;Patient/Caregiver education & training;Pain management;Transfer training;Neuromuscular re-education    Restrictions  Restrictions/Precautions  Restrictions/Precautions: Fall Risk  Required Braces or Orthoses?: No  Position Activity Restriction  Other position/activity restrictions: crow, tele, high flow O2     Subjective   Subjective  Subjective: Agreeable  Pain: Pt denies pain.  Orientation  Overall Orientation Status:  Impaired  Orientation Level: Oriented to person;Oriented to place;Disoriented to situation;Disoriented to time  Cognition  Overall Cognitive Status: Exceptions  Arousal/Alertness: Appears intact  Following Commands: Follows one step commands with increased time;Follows one step commands with repetition  Attention Span: Attends with cues to redirect  Memory: Decreased recall of precautions;Decreased recall of recent events;Decreased short term memory  Safety Judgement: Impaired;Decreased awareness of need for safety;Decreased awareness of need for assistance  Problem Solving: Assistance required to implement solutions;Assistance required to correct errors made;Impaired  Insights: Decreased awareness of deficits  Initiation: Requires cues for some  Sequencing: Requires cues for some    Objective  Vitals  Pulse: 100  BP: 95/62  MAP (Calculated): 73  SpO2: 96 % (7L NC)  Comment: /58, HR 90 and O2 92% in chair  Bed Mobility Training  Bed Mobility Training: Yes  Interventions: Verbal cues;Visual cues;Safety awareness training;Weight shifting training/pressure relief  Supine to Sit: Moderate assistance;Assist X2;Additional time;Adaptive equipment  Sit to supine: Minimal assistance of 2  Pt required to lie back down due to fatigue after 8 min EOB he did sit back up after a few minutes of rest and was able to complete tranfers with portia.  Sit to Supine: Moderate assistance;Assist X2  Scooting: Minimum assistance  Balance  Sitting: Impaired  Sitting - Static: Fair (occasional)  Sitting - Dynamic: Fair (occasional)  Standing: Impaired (Cady WILSON)  Standing - Static: Constant support;Poor  Transfer Training  Transfer Training: Yes (Cady WILSON)  Interventions: Safety awareness training;Verbal cues;Visual cues  Sit to Stand: Moderate assistance;Assist X2;Adaptive equipment;Additional time (height of bed elevated)  Stand to Sit: Moderate assistance;Assist X2  Bed to Chair: Total assistance (Cady WILSON)     PT

## 2024-08-27 NOTE — PROGRESS NOTES
[facility]     Electrophysiology                                     Progress Note    Admission date:  2024    Reason for follow up visit: AF    HPI/CC: Low Brooks was admitted on 2024 with shortness of breath. EKG showed AF. Echo showed an F of 25-30%. He was started on amiodarone with plans for restoration of sinus rhythm. He has also been treated for pneumonia, TRI and CHF. Rhythm has been AF with RVR.     Subjective: Unable to assess. Patient in chair at the time of my visit. Hypoxic, cyanotic and tachycardic. Rapid response called.     Vitals:  Blood pressure 101/70, pulse (!) 102, temperature 97.8 °F (36.6 °C), temperature source Axillary, resp. rate 21, height 1.93 m (6' 3.98\"), weight 123 kg (271 lb 3.2 oz), SpO2 100%.  Temp  Av.2 °F (36.8 °C)  Min: 97.8 °F (36.6 °C)  Max: 98.6 °F (37 °C)  Pulse  Av.8  Min: 79  Max: 124  BP  Min: 76/44  Max: 134/82  SpO2  Av.5 %  Min: 88 %  Max: 100 %    24 hour I/O    Intake/Output Summary (Last 24 hours) at 2024 1427  Last data filed at 2024 0351  Gross per 24 hour   Intake 120 ml   Output 1075 ml   Net -955 ml     Current Facility-Administered Medications   Medication Dose Route Frequency Provider Last Rate Last Admin    midodrine (PROAMATINE) tablet 5 mg  5 mg Oral TID  Viraj Liu MD        furosemide (LASIX) injection 40 mg  40 mg IntraVENous Once Viraj Liu MD        furosemide (LASIX) 500 mg in sodium chloride 0.9 % 100 mL infusion  5 mg/hr IntraVENous Continuous Viraj Liu MD        metoprolol (LOPRESSOR) injection 5 mg  5 mg IntraVENous Once Laurel Ni APRN - CNP        mupirocin (BACTROBAN) 2 % ointment   Topical BID Cory Johnson MD        amiodarone (CORDARONE) tablet 400 mg  400 mg Oral BID Ema Bergman APRN - CNP   400 mg at 24 0914    [Held by provider] metoprolol succinate (TOPROL XL) extended release tablet 25 mg  25 mg Oral BID Viraj Liu MD   25 mg at  impulse is not displaced  Heart tones are crisp and normal. irregular S1 and S2.  Jugular venous pulsation  ++  The carotid upstroke is normal in amplitude and contour without delay or bruit  Peripheral pulses are symmetrical and full   Abdomen:  No masses or tenderness  Bowel sounds present  Extremities:   No cyanosis or clubbing   1-2+ lower extremity edema, + bilateral upper extremity edema   Skin: warm and dry  Neurological:  Alert and oriented  Moves all extremities well  No abnormalities of mood, affect, memory, mentation, or behavior are noted    Data       Echo 8/23/2024    Image quality is adequate.    Left Ventricle: Severely reduced left ventricular systolic function with a visually estimated EF of 25 - 30%. Left ventricle is mildly dilated. Mild septal thickening. Severe global hypokinesis present. E/e' ratio >11, suggesting increased LAP.    Right Ventricle: Right ventricle is mildly dilated. Reduced systolic function.    Left Atrium: Left atrium is severely dilated.    Right Atrium: Right atrium is severely dilated.    Aortic Valve: Trileaflet valve. Moderate regurgitation.    Mitral Valve: Mild regurgitation.    Tricuspid Valve: Mild regurgitation. RVSP may be underestimated in the setting of poor visualization of TR jet. The estimated RVSP is 23 mmHg.    Pericardium: There is evidence of epicardial fat. Trivial localized pericardial effusion present around the left ventricle. No indication of cardiac tamponade.    Conclusion: Findings are consistent with dilated cardiomyopathy.  No prior echocardiograms for comparison.    All labs and testing reviewed.  Lab Review     Renal Profile:   Lab Results   Component Value Date/Time    CREATININE 2.0 08/27/2024 05:39 AM     08/27/2024 05:39 AM     08/27/2024 05:39 AM    K 3.9 08/27/2024 05:39 AM    K 3.8 08/24/2024 05:17 AM     08/27/2024 05:39 AM    CO2 19 08/27/2024 05:39 AM     CBC:    Lab Results   Component Value Date/Time    WBC 18.4  08/26/2024 05:14 AM    RBC 3.29 08/26/2024 05:14 AM    HGB 9.5 08/27/2024 01:26 PM    HCT 27.3 08/26/2024 05:14 AM    MCV 83.0 08/26/2024 05:14 AM    RDW 15.0 08/26/2024 05:14 AM     08/26/2024 05:14 AM     BNP:  No results found for: \"BNP\"  Fasting Lipid Panel:  No results found for: \"CHOL\", \"HDL\", \"TRIG\"  Cardiac Enzymes:  CK/MbTroponinNo results found for: \"CKTOTAL\", \"CKMB\", \"CKMBINDEX\", \"TROPONINI\"  PT/ INR   Lab Results   Component Value Date/Time    INR 1.27 08/22/2024 03:12 PM    PROTIME 16.1 08/22/2024 03:12 PM     PTT No components found for: \"PTT\"   Lab Results   Component Value Date/Time    MG 2.60 08/25/2024 06:11 AM    No results found for: \"TSH\"    Assessment:  Atrial fibrillation of unknown duration: ongoing with suboptimal rate control   -[id number] score 4 (age, HTN, CHF)    -amiodarone started this admission   Dilated cardiomyopathy:    -EF 25 to 30% on echo this admission   Acute HFrEF: volume up on exam today   HTN  HLD  TRI: nephrology following   Pneumonia   Leukocytosis   Normocytic anemia  Moderate AI   Pneumonia   Transaminitis   Lactic acidosis   Acute on chronic hypoxic respiratory failure       Plan:   1. Continue therapeutic Lovenox  2. Continue PO amiodarone to 400 mg twice daily. Can add IV for further rate control however this adds additional volume in an already fluid overloaded patient.   3. Hold beta blocker in anticipation of inotrope and lasix infusion (cardiology to see for additional recommendations for CHF and cardiogenic shock picture)   4. No ACE/ARB/ARNI given recent TRI  5. Strict I&O, daily weight, fluid and sodium restriction  6. Can anticipate ongoing tachycardia given critical illness. Options for further management of RVR limited. Once optimized from a CHF standpoint, can consider DCCV.  7. Agree with ongoing discussion regarding goals of care     Patient transferred to the ICU for further management      Critical care time spent reviewing labs/films,

## 2024-08-28 ENCOUNTER — APPOINTMENT (OUTPATIENT)
Dept: GENERAL RADIOLOGY | Age: 84
DRG: 193 | End: 2024-08-28
Payer: MEDICARE

## 2024-08-28 VITALS
DIASTOLIC BLOOD PRESSURE: 65 MMHG | SYSTOLIC BLOOD PRESSURE: 89 MMHG | BODY MASS INDEX: 32.35 KG/M2 | HEART RATE: 116 BPM | TEMPERATURE: 98.6 F | HEIGHT: 76 IN | OXYGEN SATURATION: 98 % | WEIGHT: 265.65 LBS | RESPIRATION RATE: 22 BRPM

## 2024-08-28 LAB
ALBUMIN SERPL-MCNC: 2 G/DL (ref 3.4–5)
ANION GAP SERPL CALCULATED.3IONS-SCNC: 15 MMOL/L (ref 3–16)
BUN SERPL-MCNC: 144 MG/DL (ref 7–20)
CALCIUM SERPL-MCNC: 7.6 MG/DL (ref 8.3–10.6)
CHLORIDE SERPL-SCNC: 113 MMOL/L (ref 99–110)
CO2 SERPL-SCNC: 17 MMOL/L (ref 21–32)
CREAT SERPL-MCNC: 2.9 MG/DL (ref 0.8–1.3)
DEPRECATED RDW RBC AUTO: 15.1 % (ref 12.4–15.4)
GFR SERPLBLD CREATININE-BSD FMLA CKD-EPI: 21 ML/MIN/{1.73_M2}
GLUCOSE SERPL-MCNC: 133 MG/DL (ref 70–99)
HCT VFR BLD AUTO: 20.5 % (ref 40.5–52.5)
HGB BLD-MCNC: 6.5 G/DL (ref 13.5–17.5)
MCH RBC QN AUTO: 26.7 PG (ref 26–34)
MCHC RBC AUTO-ENTMCNC: 31.4 G/DL (ref 31–36)
MCV RBC AUTO: 85 FL (ref 80–100)
PHOSPHATE SERPL-MCNC: 5.4 MG/DL (ref 2.5–4.9)
PLATELET # BLD AUTO: 507 K/UL (ref 135–450)
PMV BLD AUTO: 7.3 FL (ref 5–10.5)
POTASSIUM SERPL-SCNC: 3.9 MMOL/L (ref 3.5–5.1)
RBC # BLD AUTO: 2.42 M/UL (ref 4.2–5.9)
REASON FOR REJECTION: NORMAL
REJECTED TEST: NORMAL
SODIUM SERPL-SCNC: 145 MMOL/L (ref 136–145)
WBC # BLD AUTO: 27.6 K/UL (ref 4–11)

## 2024-08-28 PROCEDURE — 02HV33Z INSERTION OF INFUSION DEVICE INTO SUPERIOR VENA CAVA, PERCUTANEOUS APPROACH: ICD-10-PCS | Performed by: INTERNAL MEDICINE

## 2024-08-28 PROCEDURE — 2580000003 HC RX 258: Performed by: INTERNAL MEDICINE

## 2024-08-28 PROCEDURE — 6370000000 HC RX 637 (ALT 250 FOR IP): Performed by: INTERNAL MEDICINE

## 2024-08-28 PROCEDURE — 80069 RENAL FUNCTION PANEL: CPT

## 2024-08-28 PROCEDURE — 94761 N-INVAS EAR/PLS OXIMETRY MLT: CPT

## 2024-08-28 PROCEDURE — 94640 AIRWAY INHALATION TREATMENT: CPT

## 2024-08-28 PROCEDURE — 99291 CRITICAL CARE FIRST HOUR: CPT | Performed by: INTERNAL MEDICINE

## 2024-08-28 PROCEDURE — 2700000000 HC OXYGEN THERAPY PER DAY

## 2024-08-28 PROCEDURE — 85027 COMPLETE CBC AUTOMATED: CPT

## 2024-08-28 PROCEDURE — 6360000002 HC RX W HCPCS: Performed by: NURSE PRACTITIONER

## 2024-08-28 PROCEDURE — 6360000002 HC RX W HCPCS: Performed by: INTERNAL MEDICINE

## 2024-08-28 PROCEDURE — 71045 X-RAY EXAM CHEST 1 VIEW: CPT

## 2024-08-28 PROCEDURE — 2580000003 HC RX 258: Performed by: NURSE PRACTITIONER

## 2024-08-28 PROCEDURE — C1751 CATH, INF, PER/CENT/MIDLINE: HCPCS

## 2024-08-28 PROCEDURE — 6370000000 HC RX 637 (ALT 250 FOR IP)

## 2024-08-28 PROCEDURE — 36569 INSJ PICC 5 YR+ W/O IMAGING: CPT

## 2024-08-28 RX ORDER — SODIUM CHLORIDE 9 MG/ML
INJECTION, SOLUTION INTRAVENOUS PRN
Status: DISCONTINUED | OUTPATIENT
Start: 2024-08-28 | End: 2024-08-28 | Stop reason: HOSPADM

## 2024-08-28 RX ORDER — SODIUM CHLORIDE 0.9 % (FLUSH) 0.9 %
5-40 SYRINGE (ML) INJECTION EVERY 12 HOURS SCHEDULED
Status: DISCONTINUED | OUTPATIENT
Start: 2024-08-28 | End: 2024-08-28 | Stop reason: HOSPADM

## 2024-08-28 RX ORDER — AMIODARONE HYDROCHLORIDE 400 MG/1
400 TABLET ORAL 2 TIMES DAILY
Qty: 30 TABLET | Refills: 0
Start: 2024-08-28

## 2024-08-28 RX ORDER — SODIUM CHLORIDE 0.9 % (FLUSH) 0.9 %
5-40 SYRINGE (ML) INJECTION PRN
Status: DISCONTINUED | OUTPATIENT
Start: 2024-08-28 | End: 2024-08-28 | Stop reason: HOSPADM

## 2024-08-28 RX ORDER — LIDOCAINE HYDROCHLORIDE 10 MG/ML
50 INJECTION, SOLUTION INFILTRATION; PERINEURAL ONCE
Status: DISCONTINUED | OUTPATIENT
Start: 2024-08-28 | End: 2024-08-28 | Stop reason: HOSPADM

## 2024-08-28 RX ORDER — ALBUTEROL SULFATE 0.83 MG/ML
2.5 SOLUTION RESPIRATORY (INHALATION) EVERY 4 HOURS PRN
Qty: 120 EACH | Refills: 0
Start: 2024-08-28

## 2024-08-28 RX ORDER — LORAZEPAM 0.5 MG/1
0.25 TABLET ORAL ONCE
Status: COMPLETED | OUTPATIENT
Start: 2024-08-28 | End: 2024-08-28

## 2024-08-28 RX ORDER — CLOTRIMAZOLE 1 %
CREAM (GRAM) TOPICAL
Qty: 1 EACH | Refills: 0
Start: 2024-08-28 | End: 2024-09-04

## 2024-08-28 RX ADMIN — MILRINONE LACTATE IN DEXTROSE 0.12 MCG/KG/MIN: 200 INJECTION, SOLUTION INTRAVENOUS at 06:16

## 2024-08-28 RX ADMIN — IPRATROPIUM BROMIDE AND ALBUTEROL SULFATE 1 DOSE: 2.5; .5 SOLUTION RESPIRATORY (INHALATION) at 08:03

## 2024-08-28 RX ADMIN — PHENYLEPHRINE HYDROCHLORIDE 85 MCG/MIN: 50 INJECTION INTRAVENOUS at 12:56

## 2024-08-28 RX ADMIN — CEFTRIAXONE SODIUM 1000 MG: 1 INJECTION, POWDER, FOR SOLUTION INTRAMUSCULAR; INTRAVENOUS at 09:13

## 2024-08-28 RX ADMIN — GUAIFENESIN 600 MG: 600 TABLET ORAL at 09:15

## 2024-08-28 RX ADMIN — SODIUM CHLORIDE: 9 INJECTION, SOLUTION INTRAVENOUS at 09:12

## 2024-08-28 RX ADMIN — LORAZEPAM 0.25 MG: 0.5 TABLET ORAL at 15:34

## 2024-08-28 RX ADMIN — MIDODRINE HYDROCHLORIDE 5 MG: 5 TABLET ORAL at 09:15

## 2024-08-28 RX ADMIN — MUPIROCIN: 20 OINTMENT TOPICAL at 09:19

## 2024-08-28 RX ADMIN — PHENYLEPHRINE HYDROCHLORIDE 15 MCG/MIN: 50 INJECTION INTRAVENOUS at 01:56

## 2024-08-28 RX ADMIN — AMIODARONE HYDROCHLORIDE 1 MG/MIN: 50 INJECTION, SOLUTION INTRAVENOUS at 06:57

## 2024-08-28 ASSESSMENT — PAIN SCALES - GENERAL
PAINLEVEL_OUTOF10: 0

## 2024-08-28 NOTE — PROGRESS NOTES
Patient's Alejandro requirements continue to increase. Notified Hospitalist. Order placed for PICC line. Notified Nephrology. Ok'd to place PICC. Consent obtained by patients wife over the phone (2 RN verified).

## 2024-08-28 NOTE — PROGRESS NOTES
Patient's blood pressure has continued to decline. Notified Attending. New Orders placed. Patient denies pain and shortness of breath. Appears to be breathing comfortably. O2 96% on 4 liters high flow.

## 2024-08-28 NOTE — PROGRESS NOTES
[facility] [place]    Met with patient's wife Brianna to discuss hospice philosophy and services.  She is agreeable to DNRCC and HOC services and would like the [facility] inpatient unit.  Dr. Johnson putting in discharge orders and transport set up with [facility] for 330pm.  Updated ANTONIA Gonzalez and staff nurse Tereza.

## 2024-08-28 NOTE — PROGRESS NOTES
Shift: [id number]      Admitting diagnosis: Fatique, Shortness of breath    Presentation to hospital: generalized weakness & shortness of breath    Surgery: no     Nursing assessment at handoff  stable    Emergency Contact/POA:Brianna- wife  Family updated: yes - updated by MDs & RNs at bedside    Most recent vitals: BP (!) 86/60   Pulse (!) 114   Temp 98.5 °F (36.9 °C) (Axillary)   Resp (!) 39   Ht 1.93 m (6' 3.98\")   Wt 120.5 kg (265 lb 10.5 oz)   SpO2 92%   BMI 32.35 kg/m²      Rhythm: Atrial Fibrillation      NC/HFNC-  lpm  Respiratory support: - No ventilator support  - CPAP   continuous    Vent days: Day 0    Increased O2 requirements: yes -     Admission weight Weight - Scale: 122.5 kg (270 lb)  Today's weight   Wt Readings from Last 1 Encounters:   08/28/24 120.5 kg (265 lb 10.5 oz)         UOP >30ml/hr: no-      Gustafson need assessed each shift: yes, for retention    Restraints: no  Order current and documentation up to date?    Lines/Drains  LDA Insertion Date Discontinued Date Dressing Changes   PIV       TLC       Arterial       Gustafson       Vas Cath      ETT       Surgical drains        Night Shift Hospitalist Interventions    Problem(Brief) Date Time Intervention Physician contacted                                               Drip rates at handoff:    phenylephrine (GODWIN-SYNEPHRINE) 50 mg in sodium chloride 0.9 % 250 mL infusion 85 mcg/min (08/28/24 0600)    sodium chloride      amiodarone 1 mg/min (08/28/24 0657)    Followed by    amiodarone      [Held by provider] furosemide (LASIX) 500 mg in sodium chloride 0.9 % 100 mL infusion Stopped (08/28/24 0135)    milrinone 0.125 mcg/kg/min (08/28/24 0616)    sodium chloride         Hospital Course Daily Updates:  Admit Day# 5 Days 8/27/24  - Rapid response at 1230 on C4 & transferred to ICU for respiratory distress  - Cpap continuous  -CT chest + PNA & on ATB  -[place] placed  - Lasix gtt  - Milrinone gtt  - Consults Nephrology, Critical care, Hospitalist &

## 2024-08-28 NOTE — PROGRESS NOTES
08/28/24 0804   Oxygen Therapy/Pulse Ox   O2 Therapy Oxygen   $Oxygen $Daily Charge   O2 Device High flow nasal cannula   O2 Flow Rate (L/min) 4 L/min   Pulse (!) 110   Respirations 24   SpO2 93 %   $Pulse Oximeter $Spot check (multiple/continuous)

## 2024-08-28 NOTE — PROGRESS NOTES
Patient's morning H/H and creatine noted. Spoke with patient's spouse Brianna on the phone and updated on patient situation. Patient's wife stated she does not want patient to receive blood or any other escalation of care until she is able to speak with family and the physicians this morning on plan of care. RN notified Attending provider of drop in H/H. Will continue to monitor.

## 2024-08-28 NOTE — PROGRESS NOTES
Speech Language Pathology    Name: Low Brooks  : 1940  Medical Diagnosis: Paroxysmal A-fib (HCC) [I48.0]      Pt has been transferred to ICU due to change in medical status since prior SLP session. Pt will be discharged from caseload at this time. Please re-consult speech therapy when pt medically appropriate. Thank you.      Thank you,    Earlene Quick M.A. CCC-SLP   Speech-Language Pathologist

## 2024-08-28 NOTE — DISCHARGE SUMMARY
Hospital Medicine Discharge Summary    Patient: Low Brooks   : 1940     Admit Date: 2024   Discharge Date: 2024    Disposition:  []Home   []HHC  []SNF  []ECF  []Acute Rehab  []LTAC  [x]Hospice  Code status:  []Full  []DNR/CCA  []Limited (DNR/CCA with Do Not Intubate)  [x]DNRCC  Condition at Discharge: Stable  Primary Care Provider: Pavel Monroe MD    Admitting Provider: Elsi Tinoco MD  Discharge Provider: Cory Johnson MD     Discharge Diagnoses:      Active Hospital Problems    Diagnosis     Acute respiratory failure with hypoxia (HCC) [J96.01]     Atrial fibrillation with rapid ventricular response (HCC) [I48.91]     Shortness of breath [R06.02]     Dilated cardiomyopathy (HCC) [I42.0]     Paroxysmal A-fib (HCC) [I48.0]        Presenting Admission History:        83 y.o. male who presented to the emergency room with a chief complaint of shortness of breath and feeling generalized fatigue and weakness.       Upon arrival to the emergency room patient was noted to have elevated heart rate ranging from 130s to 170s  Twelve-lead EKG reveals A-fib with RVR patient workup was started with IV diltiazem bolus of 10 mg with little effect on heart rate patient received another 20 mg followed by diltiazem drip  Patient's heart rate gradually improved in the range of 130s     Currently on no home medications available.  The workup in the emergency room notable for:     Upon arrival to the emergency room patient's saturation was 86% on room air.  Patient placed on high flow and saturation increased to 93% at 6 L     Assessment/Plan:        Atrial Fibrillation with RVR   Elevated BNP   Elevated Troponin   - Elevated BNP and trop likely secondary to a. Fib   - Echo revealed an EF of 25-30%  - Full dose Lovenox   - Several IV pushes of metoprolol   - Cardiology consulted: increase amiodarone 400 mg daily, start metoprolol 25 mg BID, discontinue amiodarone gtt     -ideally required  \"BILITOT\", \"ALKPHOS\" in the last 72 hours.  No results for input(s): \"INR\", \"LACTA\", \"TSH\" in the last 72 hours.    Urine Cultures: No results found for: \"LABURIN\"  Blood Cultures:   Lab Results   Component Value Date/Time    BC No Growth after 4 days of incubation. 08/22/2024 04:36 PM     Lab Results   Component Value Date/Time    BLOODCULT2 No Growth after 4 days of incubation. 08/22/2024 04:36 PM     Organism: No results found for: \"ORG\"    Signed:    Cory Johnson MD

## 2024-08-28 NOTE — PROGRESS NOTES
Arrived to place PICC line with bedside RN Roberto. Pre-procedure and timeout done with RN, discussed limitations of placement and allergies. Consent confirmed. Vital signs stable. Labs, allergies, medications, and code status reviewed. No contraindications noted.    Procedure explained to pt, including the risk and benefits of the procedure. All questions answered. Pt verbalizes understanding of the procedure and states no more questions.     Pt's basilic, brachial, cephalic are all easily collapsible with no indication for a clot. Vein selected is large enough for catheter. Pt tolerated sterile procedure well, with no difficulty accessing right basilic vein, when accessed - blood was free flowing and non-pulsatile. Guidewire, introducer, and catheter went in smoothly.     PICC line being verified with XRAY, please do not use PICC until the impression comes back with the tip within the SVC or Cavo atrial junction. Once placement is confirmed receive orders from MD to use PICC.     If PICC is not within SVC please call [facility] and  will notify the PICC RN that is on call.    Nurses, when PICC is verified:  Please replace all existing IV tubing with new IV tubing prior to using the PICC for current IV infusions.  Please remove any PIVs from PICC arm.  All of the above may be sources of infection or an increase chance of a clot.      Post procedure - reorganized pt table, placed pt in lowest position, with call light and educated on line care. Instructed pt/RN not to use arm for at least 30min to avoid bleeding. Reported off to bedside RN.        [phone]

## 2024-08-28 NOTE — PROGRESS NOTES
Notified Respiratory, removed patient's bipap per patient request and placed on 5L nasal cannula. Patient is alert to self and place but disoriented to time and situation. Reorientation given on patient's situation. A line waveform dampened and positional, blood pressure cuff is appropriate. Will continue to monitor.

## 2024-08-28 NOTE — PROGRESS NOTES
08/28/24 0806   Oxygen Therapy/Pulse Ox   O2 Therapy Oxygen   O2 Device Nasal cannula   O2 Flow Rate (L/min) 4 L/min   Pulse (!) 110   Respirations (!) 33   SpO2 98 %

## 2024-08-28 NOTE — PROGRESS NOTES
Department of Internal Medicine  Nephrology Progress Note        SUBJECTIVE:    We are following this patient for TRI.  Due to worsening respiratory failure, patient was transferred to ICU on 8/27 and was started on milrinone along with Lasix drip  Patient was initially on BiPAP and has subsequently been weaned down to nasal cannula but he is significantly volume overloaded  Hypotension persists and Alejandro-Synephrine had to be started overnight  Renal function has worsened and patient is oliguric    Last 24-hour urine output of 0.5 L    ROS: No fever or chills.  Social: Family at bedside.    Physical Exam:    VITALS:  BP (!) 86/60   Pulse (!) 110   Temp 98.5 °F (36.9 °C) (Axillary)   Resp (!) 33   Ht 1.93 m (6' 3.98\")   Wt 120.5 kg (265 lb 10.5 oz)   SpO2 98%   BMI 32.35 kg/m²     General appearance: Seems comfortable, no acute distress.  Neck: Trachea midline, thyroid normal.   Lungs: Decreased breath sounds at the bases.  Heart:  S1S2 normal, rub or gallop. Trace peripheral edema.  Abdomen: Soft, non-tender, no organomegaly.   Skin: No lesions or rashes, warm to touch.     DATA:    CBC with Differential:    Lab Results   Component Value Date/Time    WBC 27.6 08/28/2024 04:18 AM    RBC 2.42 08/28/2024 04:18 AM    HGB 6.5 08/28/2024 04:18 AM    HCT 20.5 08/28/2024 04:18 AM     08/28/2024 04:18 AM    MCV 85.0 08/28/2024 04:18 AM    MCH 26.7 08/28/2024 04:18 AM    MCHC 31.4 08/28/2024 04:18 AM    RDW 15.1 08/28/2024 04:18 AM    NRBC 2 08/26/2024 05:14 AM    METASPCT 1 08/26/2024 05:14 AM    LYMPHOPCT 11.0 08/26/2024 05:14 AM    MONOPCT 1.0 08/26/2024 05:14 AM    EOSPCT 0.0 08/26/2024 05:14 AM    BASOPCT 0.0 08/26/2024 05:14 AM    MONOSABS 0.2 08/26/2024 05:14 AM    LYMPHSABS 2.0 08/26/2024 05:14 AM    EOSABS 0.0 08/26/2024 05:14 AM    BASOSABS 0.0 08/26/2024 05:14 AM     BMP:    Lab Results   Component Value Date/Time     08/28/2024 02:58 AM    K 3.9 08/28/2024 02:58 AM    K 3.8 08/24/2024 05:17 AM      08/28/2024 02:58 AM    CO2 17 08/28/2024 02:58 AM     08/28/2024 02:58 AM    CREATININE 2.9 08/28/2024 02:58 AM    CALCIUM 7.6 08/28/2024 02:58 AM    LABGLOM 21 08/28/2024 02:58 AM    GLUCOSE 133 08/28/2024 02:58 AM     Echocardiogram    Image quality is adequate.    Left Ventricle: Severely reduced left ventricular systolic function with a visually estimated EF of 25 - 30%. Left ventricle is mildly dilated. Mild septal thickening. Severe global hypokinesis present. E/e' ratio >11, suggesting increased LAP.    Right Ventricle: Right ventricle is mildly dilated. Reduced systolic function.    Left Atrium: Left atrium is severely dilated.    Right Atrium: Right atrium is severely dilated.    Aortic Valve: Trileaflet valve. Moderate regurgitation.    Mitral Valve: Mild regurgitation.    Tricuspid Valve: Mild regurgitation. RVSP may be underestimated in the setting of poor visualization of TR jet. The estimated RVSP is 23 mmHg.    Pericardium: There is evidence of epicardial fat. Trivial localized pericardial effusion present around the left ventricle. No indication of cardiac tamponade.    Conclusion: Findings are consistent with dilated cardiomyopathy.  No prior echocardiograms for comparison.    IMPRESSION/RECOMMENDATIONS:      - TRI:    Creatinine 2 on 8/27 from cardiorenal syndrome; oliguric TRI with creatinine up to 2.9 on 8/28   Given his tenuous cardiac status and low blood pressure, he is not a good dialysis candidate    -Acute CHF, combined       -Hypotension on Alejandro-Synephrine     - Anemia:  monitor   Hemoglobin has dropped to 6.5     - Atrial fibrillation: Management per Cardiology.    Discussed with patient's family at bedside and Dr. Johnson; as patient has not responded with aggressive medical management, considering comfort measures and hospice is likely the most reasonable approach at this time.  Family is in agreement.  Patient is considering it.

## 2024-08-28 NOTE — PROGRESS NOTES
08/27/24 1957   NIV Type   NIV Started/Stopped On   Equipment Type v60   Mode CPAP   Mask Type Full face mask   Mask Size Large   Assessment   Pulse 96   Respirations (!) 35   SpO2 99 %   Comfort Level Good   Using Accessory Muscles No   Mask Compliance Good   Skin Assessment Clean, dry, & intact   Skin Protection for O2 Device Yes   Orientation Middle;Bilateral   Location Nose;Cheek   Intervention(s) Skin Barrier   Settings/Measurements   PIP Observed 18 cm H20   CPAP/EPAP 12 cmH2O   Vt (Measured) 823 mL   FiO2  60 %   Minute Volume (L/min) 24.9 Liters   Mask Leak (lpm) 14 lpm   Patient's Home Machine No   Alarm Settings   Alarms On Y   Low Pressure (cmH2O) 6 cmH2O   High Pressure (cmH2O) 30 cmH2O   Apnea (secs) 20 secs   RR Low (bpm) 6   RR High (bpm) 40 br/min

## 2024-08-28 NOTE — DISCHARGE INSTR - COC
Continuity of Care Form    Patient Name: Low Brooks   :  1940  MRN:  [id number]    Admit date:  2024  Discharge date:  24    Code Status Order: Limited   Advance Directives:   Advance Care Flowsheet Documentation             Admitting Physician:  Elsi Tinoco MD  PCP: Pavel Monroe MD    Discharging Nurse: Tereza  Discharging Hospital Unit/Room#: 0235/0235-01  Discharging Unit Phone Number: [phone]    Emergency Contact:   Extended Emergency Contact Information  Primary Emergency Contact: Brianna Brooks  Address: [address] [place] [address]  Home Phone: [phone]  Mobile Phone: [phone]  Relation: Spouse    Past Surgical History:  No past surgical history on file.    Immunization History:   Immunization History   Administered Date(s) Administered    COVID-19, PFIZER PURPLE top, DILUTE for use, (age 12 y+), 30mcg/0.3mL 2021, 2021, 10/21/2021       Active Problems:  Patient Active Problem List   Diagnosis Code    Paroxysmal A-fib ([facility]) I48.0    Atrial fibrillation with rapid ventricular response ([facility]) I48.91    Shortness of breath R06.02    Dilated cardiomyopathy ([facility]) I42.0    Acute respiratory failure with hypoxia ([facility]) J96.01       Isolation/Infection:   Isolation            No Isolation          Patient Infection Status       None to display                     Nurse Assessment:  Last Vital Signs: /77   Pulse (!) 122   Temp 98.5 °F (36.9 °C) (Axillary)   Resp 26   Ht 1.93 m (6' 3.98\")   Wt 120.5 kg (265 lb 10.5 oz)   SpO2 98%   BMI 32.35 kg/m²     Last documented pain score (0-10 scale): Pain Level: 0  Last Weight:   Wt Readings from Last 1 Encounters:   24 120.5 kg (265 lb 10.5 oz)     Mental Status:  disoriented, alert, and logical    IV Access:  - PICC - site  R Upper Arm, insertion date: 2024    Nursing Mobility/ADLs:  Walking   Dependent  Transfer  Dependent  Bathing   05-Oct-2023 21:33

## 2024-08-28 NOTE — PROGRESS NOTES
PULMONARY AND CRITICAL CARE INPATIENT NOTE        Low Brooks   : 1940  MRN: [id number]     Admitting Physician: Elsi Tinoco MD  Attending Physician: Cory Johnson MD  PCP: Pavel Monroe MD    Admission: 2024   Date of Service: 2024    Chief Complaint   Patient presents with    Fatigue    Shortness of Breath     Patient arrives from home C/O generalized weakness and SOB. Patient reports signs and symptoms have been ongoing for \"months.\" Patient denies chest pain, denies N/V. EMS reports patient does not wear oxygen at baseline, 86% RA, placed on 2L NC initially but titrated to 4L NC. Patient poor historian, unknown medical and surgical history.            ASSESSMENT & PLAN       83 y.o. pleasant  male patient with:    Assessment:  Acute critical hypoxemic respiratory failure.  Transferred to ICU with oxygen saturation in the 70s.  Critical lactic acidosis.  Improved  A-fib with RVR  Dilated cardiomyopathy, HFrEF with EF 25 to 30%.  Acute decompensation  Acute pulmonary edema/volume overload  Lung infiltrates/community-acquired pneumonia/aspiration with volume overload likely  Pitting lower extremity edema +3  Leukocytosis  TRI  Anemia of chronic disease  Acute transaminitis  Metabolic syndrome: HTN, HLD, body habitus  Possible undiagnosed sleep apnea  Hiatal hernia      Plan:              Lasix drip on hold due to intolerance  Continue phenyl/amiodaron/milrinone  No plan for HD/CRRT  Could not tolerate CPAP  Continue antibiotics for presumed pneumonia  Hold lovenox with the Hb drop  Aspiration precautions  Family decided on no intubation  Target blood sugar between 140 and 180 mg/dL  DVT ppx measures.    Continue to wean oxygen with target 90 to 94%.  GOC discussion provided. Awaiting family decision about considering hospice.    LOS: Hospital Day: 7     Code:Limited    Critical care time spent on this patient, excluding procedures time, is ~35 minutes.  Critical care  time was spent on reviewing overnight events, interpreting labs and imaging, direct bedside exam, ventilator/breathing assistance device/drip review and adjustment, running multidisciplinary rounds and coordinating critical care plan with other providers.      Subjective/Objective           CC/Reason for Consult: Acute hypoxic respiratory failure        HPI: August 27, 2024  83-year-old male patient with PMH of PAF, dilated cardiomyopathy who was admitted on August 22 with shortness of breath, hypoxic respiratory failure and decompensated congestive heart failure as well as A-fib and RVR.  Patient had acute kidney injury and picture of pneumonia.  He was started on antibiotics, nebulizers, amiodarone drip.  Nephrology consulted and started IV fluids.  Patient transferred to the ICU on August 27 due to worsening respiratory status, oxygen needs and signs of worsening volume overload.  Cardiology/electrophysiology was following and patient was switched to p.o. amiodarone and remained on anticoagulation with Lovenox therapeutic dose, Toprol but other ACE/ARB's or ARNI's not started due to to TRI.  Plan for LIAM cardioversion once patient is euvolemic      Interval History: 8/28/2024  Couldn't continue with CPAP overnight  Urine output dropped overnight> milrinone increased> BP dropped more  Phenyl started with amiodaron for the afib  Lasix held  PICC line placed  WBC increased  Hb dropped to 6.5      Objective    Test Results:  Imaging:  I have reviewed radiology images personally.    XR CHEST PORTABLE    Result Date: 8/27/2024  1. Right infrahilar, retrocardiac, and right mid lung zone opacities which may represent pneumonia in the appropriate clinical setting.  Recommend treating the patient and following to resolution to exclude underlying masses. 2. Cardiomegaly. 3. Hiatal hernia.          PFTS:  NA      Cardiology:      Sleep:  NA      Physical Exam:  General appearance: In moderate respiratory  Performed

## 2024-08-28 NOTE — PROGRESS NOTES
08/28/24 0805   Oxygen Therapy/Pulse Ox   O2 Therapy Oxygen   O2 Device Nasal cannula   O2 Flow Rate (L/min) 4 L/min   Pulse (!) 115   Respirations (!) 32   SpO2 93 %

## 2024-08-28 NOTE — CARE COORDINATION
Case Management Discharge Summary- Hospice Discharge    Destination:   [facility]    Hospice Agency name and contact: Shabnam from [facility]    Durable Equipment arrangements are completed by the Hospice nurse.    [place] DNR signed and placed in discharge packet AND patient's hard chart. (This is required for DNR patients.)    Signed ECOC/AVS faxed to above agency/facility.    Transportation arrangements per Hospice RN.  Transport agency name and  time: 1530 via [facility]    Transport form completed and signed by:  RN  Transport form placed with discharge packet: [facility]    Notified Family: at bedside- spouse  Contact name: Brianna    Patient's RN notified of plan: Tereza    Note:    Discharging nurse to complete nursing portion of ECOC, reconcile AVS, place final copy with patient's discharge packet.  RN to ensure signed prescriptions are sent home with patient or the facility as per nursing protocol.      Lisa Dinero RN

## 2024-08-28 NOTE — PROGRESS NOTES
Spoke with Cardiology On-Call. Updated on current vitals and situation. Ordered to increase Milrinone to 0.25mcg. Titration made. Will continue to monitor.

## 2024-08-28 NOTE — PROGRESS NOTES
Physical Therapy/Occupational Therapy    Pt transferred to ICU since last PT/OT session.  Please re-order therapies once pt appropriate.  Thank you.  Lisa Tejeda, PTA#[id number]

## 2024-08-28 NOTE — PROGRESS NOTES
Spoke with Rishi Solo NP cardiology regarding patient's increasing Afib RVR. Ordered Amio drip. Will continue to monitor.

## 2024-08-28 NOTE — CARE COORDINATION
LOS 6.  Care managed by Hosp Med, Card, Pulm, Neph. Here w A Fib. Trf to ICU yesterday. Initially CPAP- now 4L. From home w spouse- has SNF recs- plan EGS.  Referral made-accepted.  Will need precert when stable. Lisa Dinero, RN     1019 Discussed w Pulm- have placed Hospice order. Spoke to pt and family at bedside.  Requesting referral to HOC- call placed.  Pall Care updated. Continue to follow. Lisa Dinero RN

## 2024-08-28 NOTE — CONSULTS
Palliative Care Initial Note  Palliative Care Admit date:  8/28/24    ACP/palcare referral noted.  Rec'd vmail from CM to say that family wanted ref called to HOC which CM completed.  Will defer involvement unless they don't admit to hospice, then will f/u w/ pt and spouse.
